# Patient Record
Sex: MALE | Race: WHITE | NOT HISPANIC OR LATINO | Employment: OTHER | URBAN - METROPOLITAN AREA
[De-identification: names, ages, dates, MRNs, and addresses within clinical notes are randomized per-mention and may not be internally consistent; named-entity substitution may affect disease eponyms.]

---

## 2018-10-31 PROBLEM — M54.16 LUMBAR RADICULOPATHY: Status: ACTIVE | Noted: 2018-10-31

## 2020-08-23 ENCOUNTER — CLINICAL SUPPORT (OUTPATIENT)
Dept: URGENT CARE | Facility: CLINIC | Age: 50
End: 2020-08-23
Payer: COMMERCIAL

## 2020-08-23 VITALS — TEMPERATURE: 98 F

## 2020-08-23 DIAGNOSIS — Z01.818 PRE-OPERATIVE CLEARANCE: ICD-10-CM

## 2020-08-23 PROCEDURE — U0003 INFECTIOUS AGENT DETECTION BY NUCLEIC ACID (DNA OR RNA); SEVERE ACUTE RESPIRATORY SYNDROME CORONAVIRUS 2 (SARS-COV-2) (CORONAVIRUS DISEASE [COVID-19]), AMPLIFIED PROBE TECHNIQUE, MAKING USE OF HIGH THROUGHPUT TECHNOLOGIES AS DESCRIBED BY CMS-2020-01-R: HCPCS

## 2020-08-23 NOTE — PROGRESS NOTES
Requests COVID testing for Pre Op. VSS, no symptoms. Will call with results.     Patient Instructions   Instructions for Patients Awaiting COVID-19 Test Results    You will either be called with your test result or it will be released to the patient portal.  If you have any questions about your test, please visit [email or web address] or call our COVID-19 information line at [phone].    Prevention steps for patients with confirmed or suspected COVID-19     Stay home except to get medical care.   Separate yourself from other people and animals in your home   Call ahead before visiting your doctor.   Wear a facemask.   Cover your coughs and sneezes.   Clean your hands often.   Avoid sharing personal household items.   Clean all high-touch surfaces every day.   Monitor your symptoms. Seek prompt medical attention if your illness is worsening (e.g., difficulty breathing). Before seeking care, call your healthcare provider.   If you have a medical emergency and need to call 911, notify the dispatch personnel that you have, or are being evaluated for COVID-19. If possible, put on a facemask before emergency medical services arrive.   Discontinuing home isolation. Call your provider about guidance to discontinue home isolation.    Recommended precautions for household members, intimate partners, and caregivers in a nonhealthcare setting of a patient with symptomatic laboratory-confirmed COVID-19 or a patient under investigation.  Household members, intimate partners, and caregivers in a nonhealthcare setting may have close contact with a person with symptomatic, laboratory-confirmed COVID-19 or a person under investigation. Close contacts should monitor their health; they should call their healthcare provider right away if they develop symptoms suggestive of COVID-19 (e.g., fever, cough, shortness of breath).    Close contacts should also follow these recommendations:   Make sure that you  understand and can help the patient follow their healthcare provider's instructions for medication(s) and care. You should help the patient with basic needs in the home and provide support for getting groceries, prescriptions, and other personal needs.   Monitor the patient's symptoms. If the patient is getting sicker, call his or her healthcare provider and tell them that the patient has laboratory-confirmed COVID-19. This will help the healthcare provider's office take steps to keep other people in the office or waiting room from getting infected. Ask the healthcare provider to call the local or [facility] health department for additional guidance. If the patient has a medical emergency and you need to call 911, notify the dispatch personnel that the patient has, or is being evaluated for COVID-19.   Household members should stay in another room or be  from the patient as much as possible. Household members should use a separate bedroom and bathroom, if available.   Prohibit visitors who do not have an essential need to be in the home.   Household members should care for any pets in the home. Do not handle pets or other animals while sick.   Make sure that shared spaces in the home have good air flow, such as by an air conditioner or an opened window, weather permitting.   Perform hand hygiene frequently. Wash your hands often with soap and water for at least 20 seconds or use an alcohol-based hand  that contains 60 to 95% alcohol, covering all surfaces of your hands and rubbing them together until they feel dry. Soap and water should be used preferentially if hands are visibly dirty.   Avoid touching your eyes, nose, and mouth with unwashed hands.   The patient should wear a facemask when you are around other people. If the patient is not able to wear a facemask (for example, because it causes trouble breathing), you, as the caregiver should wear a mask when you are in the same room as the  patient.   Wear a disposable facemask and gloves when you touch or have contact with the patient's blood, stool, or body fluids, such as saliva, sputum, nasal mucus, vomit, urine.  o Throw out disposable facemasks and gloves after using them. Do not reuse.  o When removing personal protective equipment, first remove and dispose of gloves. Then, immediately clean your hands with soap and water or alcohol-based hand . Next, remove and dispose of facemask, and immediately clean your hands again with soap and water or alcohol-based hand .   Avoid sharing household items with the patient. You should not share dishes, drinking glasses, cups, eating utensils, towels, bedding, or other items. After the patient uses these items, you should wash them thoroughly (see below Wash laundry thoroughly).   Clean all high-touch surfaces, such as counters, tabletops, doorknobs, bathroom fixtures, toilets, phones, keyboards, tablets, and bedside tables, every day. Also, clean any surfaces that may have blood, stool, or body fluids on them.   Use a household cleaning spray or wipe, according to the label instructions. Labels contain instructions for safe and effective use of the cleaning product including precautions you should take when applying the product, such as wearing gloves and making sure you have good ventilation during use of the product.   Wash laundry thoroughly.  o Immediately remove and wash clothes or bedding that have blood, stool, or body fluids on them.  o Wear disposable gloves while handling soiled items and keep soiled items away from your body. Clean your hands (with soap and water or an alcohol-based hand ) immediately after removing your gloves.  o Read and follow directions on labels of laundry or clothing items and detergent. In general, using a normal laundry detergent according to washing machine instructions and dry thoroughly using the warmest temperatures recommended on  the clothing label.   Place all used disposable gloves, facemasks, and other contaminated items in a lined container before disposing of them with other household waste. Clean your hands (with soap and water or an alcohol-based hand ) immediately after handling these items. Soap and water should be used preferentially if hands are visibly dirty.   Discuss any additional questions with your [facility] or local health department or healthcare provider. Check available hours when contacting your local health department.    For more information see CDC link below.      https://www.cdc.gov/coronavirus/2019-ncov/hcp/guidance-prevent-spread.html#precautions        Sources:  [facility], [place] Department of Health and [facility]        If not allergic,take tylenol (acetominophen) for fever control, chills, or body aches every 4 hours. Do not exceed 4000 mg/ day.If not allergic, take Motrin (Ibuprofen) every 4 hours for fever, chills, pain or inflammation. Do not exceed 2400 mg/day. You can alternate taking tylenol and motrin.    If you were prescribed a narcotic medication, do not drive or operate heavy equipment or machinery while taking these medications.  You must understand that you've received an Urgent Care treatment only and that you may be released before all your medical problems are known or treated. You, the patient, will arrange for follow up care as instructed.  Follow up with your PCP or specialty clinic as directed in the next 1-2 weeks if not improved or as needed.  You can call [phone] to schedule an appointment with the appropriate provider.  If your condition worsens we recommend that you receive another evaluation at the emergency room immediately or contact your primary medical clinics after hours call service to discuss your concerns.    Please return here or go to the Emergency Department for any concerns or worsening of condition.    If you have been referred to another provider and wish to  call to check on the status of your referral, please call [facility] Scheduling at [phone]

## 2020-08-24 LAB — SARS-COV-2 RNA RESP QL NAA+PROBE: NOT DETECTED

## 2020-08-25 ENCOUNTER — TELEPHONE (OUTPATIENT)
Dept: URGENT CARE | Facility: CLINIC | Age: 50
End: 2020-08-25

## 2020-08-28 PROBLEM — K62.89 ANAL PAIN: Status: ACTIVE | Noted: 2020-08-28

## 2020-10-21 PROBLEM — C20 MALIGNANT NEOPLASM OF RECTUM: Status: ACTIVE | Noted: 2020-10-21

## 2020-12-15 ENCOUNTER — TELEPHONE (OUTPATIENT)
Dept: SURGERY | Facility: CLINIC | Age: 50
End: 2020-12-15

## 2020-12-15 NOTE — TELEPHONE ENCOUNTER
----- Message from Jessica Patton sent at 12/15/2020  3:07 PM CST -----  Contact: Pt  Pt states he was referred by Dr. 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 Pt states Dr. Tirado felt Dr. Olivarez would be better equipped for surgery. Please follow up with Pt for further questioning.      Confirmed patient's contact info below:     Patient Name: Alexi Gayle     Phone Number: [phone]

## 2020-12-15 NOTE — TELEPHONE ENCOUNTER
Spoke with patient.Informed Dr. Olivarez received message to reach out to referring MD. Patient will be contacted with plan of care.

## 2020-12-17 ENCOUNTER — TELEPHONE (OUTPATIENT)
Dept: SURGERY | Facility: CLINIC | Age: 50
End: 2020-12-17

## 2020-12-17 NOTE — TELEPHONE ENCOUNTER
----- Message from ERICA Olivarez MD sent at 12/16/2020  5:56 PM CST -----  Regarding: office visit at [place]  Contact: Dr. Tirado: [phone]  Please arrange for office visit at [place] office.   Thanks  DV  ----- Message -----  From: Sharlene Chapa RN  Sent: 12/15/2020   3:24 PM CST  To: ERICA Olivarez MD, [facility]    Nadiya- just wanted to pull you into the loop early on.  ----- Message -----  From: Tiara Iniguez  Sent: 12/15/2020   2:29 PM CST  To: [facility] Staff    Dr. Tirado would like to refer the pt over to Dr. Olivarez but would like to speak with Dr. Olivarez first       Please contact Dr. Tirado: [phone]

## 2020-12-17 NOTE — TELEPHONE ENCOUNTER
Spoke with patient and informed him of an appointment with Dr. Olivarez on Monday 12/28 in [place].

## 2020-12-27 NOTE — PROGRESS NOTES
CRS Office Visit History and Physical    Referring Md:   Eagle Triado Md  [address] [place] [address]    SUBJECTIVE:     Chief Complaint: rectal cancer and anal cancer    History of Present Illness:  Patient is a 50 y.o. male presents with history of anal cancer diagnosed in August 2020.  He sought medical attention because he felt an enlarging anal lesion, discomfort.  No bleeding.  He underwent EUA wt excison of an anterior anal mass.      1.  ANAL LESION AT 12 O'CLOCK, EXCISION:   - IN SITU AND INVASIVE WELL DIFFERENTIATED SQUAMOUS CARCINOMA.     2.  POSTERIOR ANAL LESION, EXCISION:   - SKIN TAG.   _______________________________________________________________________   SPECIMEN AND SOURCE:   1. Anal lesion 12 o'clock (perm)   2. Anal lesion posterior (perm)     In addition the patient has a history of rectal cancer.    Colonoscopy revealed a polyp removed by piecemeal snare resection.  This revealed invasive CA.  He was referred for EUS and scar was noted and removed by EMR.  Findings:        ENDOSCOPIC FINDING: :        A medium sized post polypectomy scar was found in the proximal        rectum. It was located along the anterior wall at approximately 10cm        from the ano-rectal verge, proximal to the 2nd rectal fold. The scar        tissue was healthy in appearance. There was no evidence of the        previous polyp. Biopsies were taken with a cold forceps for        histology. There was a small tattoo that was previously placed at        the site, but additional tattoos were placed with injections of Spot        (carbon black). A tattoo was placed on either side of the        polypectomy site and at a 3rd site just distal to the site, to form        a triangle with the polypectomy site in the center. This is to help        guide surgical resection if it becomes necessary.        In the anal canal and exeteriorly, several small condylomas were        noted.        ENDOSONOGRAPHIC  "FINDING: :        The wall layers of the rectum were intact and normal appearing.        There was no evidence of mass or tumor. There was no evidence of        karime-rectal lymphadenopathy. The iliac vessels were visualized and        appeared normal. The seminal vesicles and prostate were visualized        and appeared normal. The internal and external anal sphincters        appeared normal.      Patient - TONY BAUTISTA                  - 1970 [facility] Rec # - [id number]                      Eagle Galo W., M.D.   The following is an electronic copy of report # [id number] from:   [facility]   [address] [place], [place] [id number]                         Phone [phone]   DIAGNOSIS:   2020   JL/kb     Patient - TONY BAUTISTA                  - 1970 [facility] Rec # - [id number]                      Gary Nichols R., M.D.   The following is an electronic copy of report # [id number] from:   [facility]   [address] [place] [address]                         Phone [phone]   DIAGNOSIS:   10/23/2020 JAR/kl     Currently "flared up"  Anal pain since having constipated BM last weekend.  No blood.  No mass since excision of anal lesion.    BMs no  Blood.    Wt stable.  Poor appetite.  No chest pain or cough.      Past Medical History:   Diagnosis Date    DDD (degenerative disc disease), lumbar     Diabetes mellitus     Malignant neoplasm of rectum 10/21/2020    BARON on CPAP     Renal calculi     Spinal stenosis of lumbar region        Past Surgical History:   Procedure Laterality Date    APPENDECTOMY  89    BACK SURGERY  2000    removed piece of bone cartilage from sciatic nerve area    ENDOSCOPIC ULTRASOUND OF LOWER GASTROINTESTINAL TRACT Left 10/21/2020    Procedure: ULTRASOUND, LOWER GI TRACT, ENDOSCOPIC;  Surgeon: Gary Randall MD;  Location: " [facility] ENDO;  Service: Endoscopy;  Laterality: Left;  Gif / Radial    EXAMINATION UNDER ANESTHESIA N/A 8/28/2020    Procedure: Exam under anesthesia -DILATION, RECTUM;  Surgeon: Eagle Tirado MD;  Location: [facility] OR;  Service: General;  Laterality: N/A;    FLEXIBLE SIGMOIDOSCOPY Left 10/21/2020    Procedure: SIGMOIDOSCOPY, FLEXIBLE;  Surgeon: Gary Randall MD;  Location: [facility] ENDO;  Service: Endoscopy;  Laterality: Left;    GALLBLADDER SURGERY  2011    KIDNEY STONE SURGERY  1/2013    LUMBAR EPIDURAL INJECTION  6/2015    MYELOGRAPHY N/A 10/31/2018    Procedure: Myelogram lumbar;  Surgeon: Minesh Infante MD;  Location: [facility] CATH;  Service: Radiology;  Laterality: N/A;    NOSE SURGERY  1991       Review of patient's allergies indicates:  No Known Allergies    Current Outpatient Medications on File Prior to Visit   Medication Sig Dispense Refill    FREESTYLE LITE STRIPS Strp   5    Lactobacillus rhamnosus GG (CULTURELLE) 10 billion cell capsule Take 1 capsule by mouth once daily.      metFORMIN (GLUCOPHAGE-XR) 500 MG ER 24hr tablet Take 1,000 mg by mouth 2 (two) times daily with meals.       NON FORMULARY MEDICATION Take 1 Dose by mouth every evening.       ondansetron (ZOFRAN) 4 MG tablet Take 1 tablet (4 mg total) by mouth every 6 (six) hours. 12 tablet 0    ONE DAILY MULTIVITAMIN ORAL Take 1 tablet by mouth once daily.      oxyCODONE-acetaminophen (PERCOCET) 5-325 mg per tablet Take 1 tablet by mouth every 4 (four) hours as needed for Pain. 12 tablet 0    tamsulosin (FLOMAX) 0.4 mg Cap Take 0.4 mg by mouth once daily.      testosterone cypionate (DEPOTESTOTERONE CYPIONATE) 200 mg/mL injection Inject 100 mg into the muscle every Wednesday.      traMADoL (ULTRAM-ER) 200 MG Tb24 Take 1 tablet by mouth every morning.       traZODone (DESYREL) 150 MG tablet Take 300 mg by mouth nightly.        No current facility-administered medications on file prior to visit.        Family History    Problem Relation Age of Onset    Diabetes Mother     Heart disease Father     Cancer Father     Diabetes Father     Kidney disease Father     Hypertension Father     Diabetes Brother        Social History     Tobacco Use    Smoking status: Current Every Day Smoker     Packs/day: 0.50     Start date: 11/17/1994    Smokeless tobacco: Never Used   Substance Use Topics    Alcohol use: No    Drug use: Yes     Types: Marijuana     Comment: nightly        Review of Systems:  ROS:  GENERAL: No fever, chills, fatigability or weight loss.  Integument:  No rashes, redness, icterus  CHEST: Denies DENG, cyanosis, wheezing, cough and sputum production.  CARDIOVASCULAR: Denies chest pain, PND, orthopnea or reduced exercise tolerance.  GI:  Denies abd pain, dysphagia, nausea, vomiting, no hematemesis, no rectal pain  : Denies burning on urination, no hematuria, no bacteriuria  MSK:  No deformities, swelling, joint pain swelling  Neurologic:  No HAs, seizures, weakness, paresthesias, gait problems      OBJECTIVE:     Vital Signs (Most Recent)  /81   Pulse 87   Temp 98.2 °F (36.8 °C)   Wt 92.9 kg (204 lb 12.9 oz)   BMI 32.08 kg/m²   Physical Exam:  General: White male in no distress   Skin/ Sclera anicteric  LN small inguinal nodes, left side <1 cm  HEENT: anicteric, normocephalic, extraocular movements intact   Neck trachea midline, thyroid non enlarged  Chest symmetric, nl excursions, no retractions  Respiratory: respirations are even and unlabored  COR RRR   Abdomen - inspection   soft NT ND.  no masses, no organomegaly    Anorectal: Marked spasm.  Scar anteriorly without any palpable mass  Inspection             JOSESITO: Increased  tone, no palpable anal masses  Extremities: Warm dry and intact.  NO CCE  Neuro: alert and oriented x 4.  Moves all extremities.         ASSESSMENT/PLAN:   1.  Anal cancer  2.  Rectal cancer  3.  Anal fissure      Recommend  Flex sig  PET scan  Review pathology from anal lesion and  rectal polyp excision  Treat anal fissure  1. SOAK TID in tub warm water only (or hand held shower to anus)  2. Diltiazem cream TID  3.  High fiber diet - 25 grams per day.  Emphasis on whole grain breads such as double fiber wheat bread and high fiber cereals and bars.    Drink 8 glasses of water per day 64 - 96 oz per day  Fiber supplements such as KONSYL, METAMUCIL can be taken 1-2 x per day  Regular exercise - 30 min brisk walking 5 days per week  4.  RTC 1 week.    5.  Begin miralax daily prn

## 2020-12-28 ENCOUNTER — OFFICE VISIT (OUTPATIENT)
Dept: SURGERY | Facility: CLINIC | Age: 50
End: 2020-12-28
Payer: COMMERCIAL

## 2020-12-28 ENCOUNTER — LAB VISIT (OUTPATIENT)
Dept: LAB | Facility: HOSPITAL | Age: 50
End: 2020-12-28
Attending: COLON & RECTAL SURGERY
Payer: COMMERCIAL

## 2020-12-28 VITALS
HEART RATE: 87 BPM | DIASTOLIC BLOOD PRESSURE: 81 MMHG | BODY MASS INDEX: 32.08 KG/M2 | SYSTOLIC BLOOD PRESSURE: 125 MMHG | TEMPERATURE: 98 F | WEIGHT: 204.81 LBS

## 2020-12-28 DIAGNOSIS — C21.0 ANAL CANCER: Primary | ICD-10-CM

## 2020-12-28 DIAGNOSIS — K60.1 CHRONIC POSTERIOR ANAL FISSURE: ICD-10-CM

## 2020-12-28 DIAGNOSIS — C20 RECTAL CANCER: ICD-10-CM

## 2020-12-28 LAB — CEA SERPL-MCNC: 5.3 NG/ML (ref 0–5)

## 2020-12-28 PROCEDURE — 99204 PR OFFICE/OUTPT VISIT, NEW, LEVL IV, 45-59 MIN: ICD-10-PCS | Mod: S$GLB,,, | Performed by: COLON & RECTAL SURGERY

## 2020-12-28 PROCEDURE — 3008F BODY MASS INDEX DOCD: CPT | Mod: CPTII,S$GLB,, | Performed by: COLON & RECTAL SURGERY

## 2020-12-28 PROCEDURE — 99204 OFFICE O/P NEW MOD 45 MIN: CPT | Mod: S$GLB,,, | Performed by: COLON & RECTAL SURGERY

## 2020-12-28 PROCEDURE — 99999 PR PBB SHADOW E&M-EST. PATIENT-LVL III: ICD-10-PCS | Mod: PBBFAC,,, | Performed by: COLON & RECTAL SURGERY

## 2020-12-28 PROCEDURE — 99999 PR PBB SHADOW E&M-EST. PATIENT-LVL III: CPT | Mod: PBBFAC,,, | Performed by: COLON & RECTAL SURGERY

## 2020-12-28 PROCEDURE — 36415 COLL VENOUS BLD VENIPUNCTURE: CPT | Mod: PN

## 2020-12-28 PROCEDURE — 82378 CARCINOEMBRYONIC ANTIGEN: CPT | Mod: PN

## 2020-12-28 PROCEDURE — 3008F PR BODY MASS INDEX (BMI) DOCUMENTED: ICD-10-PCS | Mod: CPTII,S$GLB,, | Performed by: COLON & RECTAL SURGERY

## 2020-12-28 PROCEDURE — 82378 CARCINOEMBRYONIC ANTIGEN: CPT

## 2020-12-28 RX ORDER — SILDENAFIL 100 MG/1
100 TABLET, FILM COATED ORAL
COMMUNITY
Start: 2020-11-11

## 2020-12-28 NOTE — LETTER
December 29, 2020      Eagle Tirado MD  [address]  [facility] [address]           [facility]  [address] [facility] [address]  [facility] [address]  Phone: [phone]  Fax: [phone]          Patient: Alexi Gayle   MR Number: [id number]   YOB: 1970   Date of Visit: 12/28/2020       Dear Dr. Eagle Tirado:    Thank you for referring Alexi Gayle to me for evaluation. Attached you will find relevant portions of my assessment and plan of care.    If you have questions, please do not hesitate to call me. I look forward to following Alexi Gayle along with you.    Sincerely,    ERICA Olivarez MD    Enclosure  CC:  No Recipients    If you would like to receive this communication electronically, please contact [email or web address] or [phone] to request more information on [facility] access.    For providers and/or their staff who would like to refer a patient to [facility], please contact us through our one-stop-shop provider referral line, [facility], at [phone].    If you feel you have received this communication in error or would no longer like to receive these types of communications, please e-mail [email or web address]

## 2020-12-29 ENCOUNTER — TELEPHONE (OUTPATIENT)
Dept: GASTROENTEROLOGY | Facility: CLINIC | Age: 50
End: 2020-12-29

## 2020-12-29 DIAGNOSIS — Z11.52 ENCOUNTER FOR PREOPERATIVE SCREENING LABORATORY TESTING FOR COVID-19 VIRUS: ICD-10-CM

## 2020-12-29 DIAGNOSIS — Z01.812 ENCOUNTER FOR PREOPERATIVE SCREENING LABORATORY TESTING FOR COVID-19 VIRUS: ICD-10-CM

## 2020-12-29 PROBLEM — K60.1 CHRONIC POSTERIOR ANAL FISSURE: Status: ACTIVE | Noted: 2020-12-29

## 2020-12-29 PROBLEM — C21.0 ANAL CANCER: Status: ACTIVE | Noted: 2020-12-29

## 2020-12-29 PROBLEM — C44.500: Status: ACTIVE | Noted: 2020-12-29

## 2020-12-30 ENCOUNTER — DOCUMENTATION ONLY (OUTPATIENT)
Dept: SURGERY | Facility: HOSPITAL | Age: 50
End: 2020-12-30

## 2020-12-30 NOTE — PROGRESS NOTES
Multidisciplinary Rectal Cancer Conference - Evaluation and Recommendation Summary  12/30/2020  Alexi Gayle  [id number]  50 y.o. male    1. Evaluation    50M presents with history of anal cancer diagnosed in August 2020.  He sought medical attention because he felt an enlarging anal lesion, discomfort.  No bleeding. He underwent EUA [facility] excison of an anterior anal mass. Biopsy with in situ, well differentiated SCC.    Colonoscopy revealed a polyp removed by piecemeal snare resection.  This revealed invasive CA.  He was referred for EUS and scar was noted and removed by EMR.    ENDOSCOPIC FINDING: A medium sized post polypectomy scar was found in the proximal rectum. It was located along the anterior wall at approximately 10cm from the ano-rectal verge, proximal to the 2nd rectal fold. The scar tissue was healthy in appearance. There was no evidence of the previous polyp. Biopsies were taken with a cold forceps for histology. There was a small tattoo that was previously placed at the site, but additional tattoos were placed with injections of Spot (carbon black). A tattoo was placed on either side of the   polypectomy site and at a 3rd site just distal to the site, to form a triangle with the polypectomy site in the center. This is to help guide surgical resection if it becomes necessary.  In the anal canal and exeteriorly, several small condylomas were noted.     ENDOSONOGRAPHIC FINDING: The wall layers of the rectum were intact and normal appearing. There was no evidence of mass or tumor. There was no evidence of karime-rectal lymphadenopathy. The iliac vessels were visualized and appeared normal. The seminal vesicles and prostate were visualized and appeared normal. The internal and external anal sphincters appeared normal.    Rectal polypectomy site negative for dysplasia.     CEA level:   Lab Results   Component Value Date    CEA 5.3 (H) 12/28/2020       2. Treatment Recommendation    Will plan to obtain  pathology from outside hospital to review and make recommendations for further treatment.

## 2020-12-30 NOTE — PATIENT INSTRUCTIONS
1. SOAK TID in tub warm water only (or hand held shower to anus)  2. Diltiazem cream TID  3.  High fiber diet - 25 grams per day.  Emphasis on whole grain breads such as double fiber wheat bread and high fiber cereals and bars.    Drink 8 glasses of water per day 64 - 96 oz per day  Fiber supplements such as KONSYL, METAMUCIL can be taken 1-2 x per day  Regular exercise - 30 min brisk walking 5 days per week  4.  RTC 6 weeks.    5.  Begin miralax daily prn

## 2021-01-02 ENCOUNTER — LAB VISIT (OUTPATIENT)
Dept: FAMILY MEDICINE | Facility: CLINIC | Age: 51
End: 2021-01-02
Payer: COMMERCIAL

## 2021-01-02 DIAGNOSIS — Z01.812 ENCOUNTER FOR PREOPERATIVE SCREENING LABORATORY TESTING FOR COVID-19 VIRUS: ICD-10-CM

## 2021-01-02 DIAGNOSIS — Z11.52 ENCOUNTER FOR PREOPERATIVE SCREENING LABORATORY TESTING FOR COVID-19 VIRUS: ICD-10-CM

## 2021-01-02 PROCEDURE — U0003 INFECTIOUS AGENT DETECTION BY NUCLEIC ACID (DNA OR RNA); SEVERE ACUTE RESPIRATORY SYNDROME CORONAVIRUS 2 (SARS-COV-2) (CORONAVIRUS DISEASE [COVID-19]), AMPLIFIED PROBE TECHNIQUE, MAKING USE OF HIGH THROUGHPUT TECHNOLOGIES AS DESCRIBED BY CMS-2020-01-R: HCPCS

## 2021-01-03 LAB — SARS-COV-2 RNA RESP QL NAA+PROBE: NOT DETECTED

## 2021-01-04 ENCOUNTER — HOSPITAL ENCOUNTER (OUTPATIENT)
Facility: HOSPITAL | Age: 51
Discharge: HOME | End: 2021-01-04
Attending: COLON & RECTAL SURGERY | Admitting: COLON & RECTAL SURGERY
Payer: COMMERCIAL

## 2021-01-04 ENCOUNTER — ANESTHESIA EVENT (OUTPATIENT)
Dept: ENDOSCOPY | Facility: HOSPITAL | Age: 51
End: 2021-01-04
Payer: COMMERCIAL

## 2021-01-04 ENCOUNTER — TELEPHONE (OUTPATIENT)
Dept: SURGERY | Facility: CLINIC | Age: 51
End: 2021-01-04

## 2021-01-04 ENCOUNTER — ANESTHESIA (OUTPATIENT)
Dept: ENDOSCOPY | Facility: HOSPITAL | Age: 51
End: 2021-01-04
Payer: COMMERCIAL

## 2021-01-04 DIAGNOSIS — C20 MALIGNANT NEOPLASM OF RECTUM: Primary | ICD-10-CM

## 2021-01-04 DIAGNOSIS — C21.0 ANAL CANCER: Primary | ICD-10-CM

## 2021-01-04 DIAGNOSIS — C20 RECTAL CANCER: ICD-10-CM

## 2021-01-04 LAB — GLUCOSE SERPL-MCNC: 173 MG/DL (ref 70–110)

## 2021-01-04 PROCEDURE — D9220A PRA ANESTHESIA: Mod: ANES,,, | Performed by: ANESTHESIOLOGY

## 2021-01-04 PROCEDURE — D9220A PRA ANESTHESIA: ICD-10-PCS | Mod: ANES,,, | Performed by: ANESTHESIOLOGY

## 2021-01-04 PROCEDURE — 63600175 PHARM REV CODE 636 W HCPCS: Mod: PO | Performed by: NURSE ANESTHETIST, CERTIFIED REGISTERED

## 2021-01-04 PROCEDURE — 45330 DIAGNOSTIC SIGMOIDOSCOPY: CPT | Mod: PO | Performed by: COLON & RECTAL SURGERY

## 2021-01-04 PROCEDURE — 37000009 HC ANESTHESIA EA ADD 15 MINS: Mod: PO | Performed by: COLON & RECTAL SURGERY

## 2021-01-04 PROCEDURE — 37000008 HC ANESTHESIA 1ST 15 MINUTES: Mod: PO | Performed by: COLON & RECTAL SURGERY

## 2021-01-04 PROCEDURE — D9220A PRA ANESTHESIA: Mod: CRNA,,, | Performed by: NURSE ANESTHETIST, CERTIFIED REGISTERED

## 2021-01-04 PROCEDURE — 25000003 PHARM REV CODE 250: Mod: PO | Performed by: NURSE ANESTHETIST, CERTIFIED REGISTERED

## 2021-01-04 PROCEDURE — 45330 PR SIGMOIDOSCOPY,DIAG2STIC: ICD-10-PCS | Mod: ,,, | Performed by: COLON & RECTAL SURGERY

## 2021-01-04 PROCEDURE — 45330 DIAGNOSTIC SIGMOIDOSCOPY: CPT | Mod: ,,, | Performed by: COLON & RECTAL SURGERY

## 2021-01-04 PROCEDURE — D9220A PRA ANESTHESIA: ICD-10-PCS | Mod: CRNA,,, | Performed by: NURSE ANESTHETIST, CERTIFIED REGISTERED

## 2021-01-04 RX ORDER — LIDOCAINE HYDROCHLORIDE 20 MG/ML
INJECTION INTRAVENOUS
Status: DISCONTINUED | OUTPATIENT
Start: 2021-01-04 | End: 2021-01-04

## 2021-01-04 RX ORDER — PROPOFOL 10 MG/ML
VIAL (ML) INTRAVENOUS
Status: DISCONTINUED | OUTPATIENT
Start: 2021-01-04 | End: 2021-01-04

## 2021-01-04 RX ORDER — PROPOFOL 10 MG/ML
VIAL (ML) INTRAVENOUS CONTINUOUS PRN
Status: DISCONTINUED | OUTPATIENT
Start: 2021-01-04 | End: 2021-01-04

## 2021-01-04 RX ADMIN — LIDOCAINE HYDROCHLORIDE 100 MG: 20 INJECTION, SOLUTION INTRAVENOUS at 07:01

## 2021-01-04 RX ADMIN — PROPOFOL 150 MCG/KG/MIN: 10 INJECTION, EMULSION INTRAVENOUS at 07:01

## 2021-01-04 RX ADMIN — PROPOFOL 100 MG: 10 INJECTION, EMULSION INTRAVENOUS at 07:01

## 2021-01-04 RX ADMIN — SODIUM CHLORIDE, SODIUM LACTATE, POTASSIUM CHLORIDE, AND CALCIUM CHLORIDE: .6; .31; .03; .02 INJECTION, SOLUTION INTRAVENOUS at 07:01

## 2021-01-05 VITALS
HEIGHT: 70 IN | DIASTOLIC BLOOD PRESSURE: 58 MMHG | SYSTOLIC BLOOD PRESSURE: 98 MMHG | HEART RATE: 80 BPM | WEIGHT: 195 LBS | BODY MASS INDEX: 27.92 KG/M2 | OXYGEN SATURATION: 99 % | RESPIRATION RATE: 16 BRPM | TEMPERATURE: 98 F

## 2021-01-06 ENCOUNTER — HOSPITAL ENCOUNTER (OUTPATIENT)
Dept: RADIOLOGY | Facility: HOSPITAL | Age: 51
Discharge: HOME | End: 2021-01-06
Attending: COLON & RECTAL SURGERY
Payer: COMMERCIAL

## 2021-01-06 DIAGNOSIS — C21.0 ANAL CANCER: ICD-10-CM

## 2021-01-06 DIAGNOSIS — C20 RECTAL CANCER: ICD-10-CM

## 2021-01-06 PROCEDURE — 71260 CT THORAX DX C+: CPT | Mod: 26,,, | Performed by: RADIOLOGY

## 2021-01-06 PROCEDURE — 71260 CT CHEST WITH CONTRAST: ICD-10-PCS | Mod: 26,,, | Performed by: RADIOLOGY

## 2021-01-06 PROCEDURE — 25500020 PHARM REV CODE 255: Mod: PO | Performed by: COLON & RECTAL SURGERY

## 2021-01-06 PROCEDURE — 71260 CT THORAX DX C+: CPT | Mod: TC,PO

## 2021-01-06 RX ADMIN — IOHEXOL 75 ML: 350 INJECTION, SOLUTION INTRAVENOUS at 12:01

## 2021-01-11 ENCOUNTER — OFFICE VISIT (OUTPATIENT)
Dept: SURGERY | Facility: CLINIC | Age: 51
End: 2021-01-11
Payer: COMMERCIAL

## 2021-01-11 VITALS
OXYGEN SATURATION: 97 % | SYSTOLIC BLOOD PRESSURE: 121 MMHG | TEMPERATURE: 97 F | HEART RATE: 84 BPM | BODY MASS INDEX: 29.65 KG/M2 | DIASTOLIC BLOOD PRESSURE: 70 MMHG | HEIGHT: 70 IN | WEIGHT: 207.13 LBS

## 2021-01-11 DIAGNOSIS — C21.0 ANAL CANCER: ICD-10-CM

## 2021-01-11 DIAGNOSIS — C20 RECTAL CANCER: Primary | ICD-10-CM

## 2021-01-11 PROCEDURE — 99214 PR OFFICE/OUTPT VISIT, EST, LEVL IV, 30-39 MIN: ICD-10-PCS | Mod: S$GLB,,, | Performed by: COLON & RECTAL SURGERY

## 2021-01-11 PROCEDURE — 1126F PR PAIN SEVERITY QUANTIFIED, NO PAIN PRESENT: ICD-10-PCS | Mod: S$GLB,,, | Performed by: COLON & RECTAL SURGERY

## 2021-01-11 PROCEDURE — 1126F AMNT PAIN NOTED NONE PRSNT: CPT | Mod: S$GLB,,, | Performed by: COLON & RECTAL SURGERY

## 2021-01-11 PROCEDURE — 3008F PR BODY MASS INDEX (BMI) DOCUMENTED: ICD-10-PCS | Mod: CPTII,S$GLB,, | Performed by: COLON & RECTAL SURGERY

## 2021-01-11 PROCEDURE — 99214 OFFICE O/P EST MOD 30 MIN: CPT | Mod: S$GLB,,, | Performed by: COLON & RECTAL SURGERY

## 2021-01-11 PROCEDURE — 3008F BODY MASS INDEX DOCD: CPT | Mod: CPTII,S$GLB,, | Performed by: COLON & RECTAL SURGERY

## 2021-01-11 PROCEDURE — 99999 PR PBB SHADOW E&M-EST. PATIENT-LVL IV: CPT | Mod: PBBFAC,,, | Performed by: COLON & RECTAL SURGERY

## 2021-01-11 PROCEDURE — 99999 PR PBB SHADOW E&M-EST. PATIENT-LVL IV: ICD-10-PCS | Mod: PBBFAC,,, | Performed by: COLON & RECTAL SURGERY

## 2021-01-11 RX ORDER — POLYETHYLENE GLYCOL 3350 17 G/17G
POWDER, FOR SOLUTION ORAL
Qty: 238 G | Refills: 0 | Status: ON HOLD | OUTPATIENT
Start: 2021-01-11 | End: 2021-01-21 | Stop reason: HOSPADM

## 2021-01-11 RX ORDER — METRONIDAZOLE 500 MG/1
TABLET ORAL
Qty: 3 TABLET | Refills: 0 | Status: ON HOLD | OUTPATIENT
Start: 2021-01-11 | End: 2021-01-21 | Stop reason: HOSPADM

## 2021-01-11 RX ORDER — NEOMYCIN SULFATE 500 MG/1
TABLET ORAL
Qty: 6 TABLET | Refills: 0 | Status: ON HOLD | OUTPATIENT
Start: 2021-01-11 | End: 2021-01-21 | Stop reason: HOSPADM

## 2021-01-15 DIAGNOSIS — Z01.818 PRE-OP EVALUATION: Primary | ICD-10-CM

## 2021-01-20 ENCOUNTER — TELEPHONE (OUTPATIENT)
Dept: SURGERY | Facility: CLINIC | Age: 51
End: 2021-01-20

## 2021-01-21 ENCOUNTER — ANESTHESIA EVENT (OUTPATIENT)
Dept: SURGERY | Facility: HOSPITAL | Age: 51
End: 2021-01-21
Payer: COMMERCIAL

## 2021-01-21 ENCOUNTER — ANESTHESIA (OUTPATIENT)
Dept: SURGERY | Facility: HOSPITAL | Age: 51
End: 2021-01-21
Payer: COMMERCIAL

## 2021-01-21 ENCOUNTER — HOSPITAL ENCOUNTER (OUTPATIENT)
Facility: HOSPITAL | Age: 51
Discharge: HOME | End: 2021-01-21
Attending: COLON & RECTAL SURGERY | Admitting: COLON & RECTAL SURGERY
Payer: COMMERCIAL

## 2021-01-21 VITALS
HEART RATE: 96 BPM | BODY MASS INDEX: 27.92 KG/M2 | WEIGHT: 195 LBS | SYSTOLIC BLOOD PRESSURE: 126 MMHG | TEMPERATURE: 98 F | HEIGHT: 70 IN | OXYGEN SATURATION: 95 % | DIASTOLIC BLOOD PRESSURE: 77 MMHG | RESPIRATION RATE: 20 BRPM

## 2021-01-21 DIAGNOSIS — C21.0 ANAL CANCER: Primary | ICD-10-CM

## 2021-01-21 DIAGNOSIS — Z85.048 HISTORY OF RECTAL CANCER: ICD-10-CM

## 2021-01-21 DIAGNOSIS — Z85.048 HISTORY OF ANAL CANCER: ICD-10-CM

## 2021-01-21 LAB
POCT GLUCOSE: 156 MG/DL (ref 70–110)
POCT GLUCOSE: 186 MG/DL (ref 70–110)
SARS-COV-2 RDRP RESP QL NAA+PROBE: NEGATIVE

## 2021-01-21 PROCEDURE — 45330 PR SIGMOIDOSCOPY,DIAG2STIC: ICD-10-PCS | Mod: 51,,, | Performed by: COLON & RECTAL SURGERY

## 2021-01-21 PROCEDURE — 36000710: Performed by: COLON & RECTAL SURGERY

## 2021-01-21 PROCEDURE — U0002 COVID-19 LAB TEST NON-CDC: HCPCS

## 2021-01-21 PROCEDURE — 71000044 HC DOSC ROUTINE RECOVERY FIRST HOUR: Performed by: COLON & RECTAL SURGERY

## 2021-01-21 PROCEDURE — 46922 EXCISION OF ANAL LESION(S): CPT | Mod: ,,, | Performed by: COLON & RECTAL SURGERY

## 2021-01-21 PROCEDURE — 63600175 PHARM REV CODE 636 W HCPCS: Performed by: NURSE ANESTHETIST, CERTIFIED REGISTERED

## 2021-01-21 PROCEDURE — D9220A PRA ANESTHESIA: ICD-10-PCS | Mod: ANES,,, | Performed by: ANESTHESIOLOGY

## 2021-01-21 PROCEDURE — 88305 TISSUE EXAM BY PATHOLOGIST: CPT | Performed by: PATHOLOGY

## 2021-01-21 PROCEDURE — 36000711: Performed by: COLON & RECTAL SURGERY

## 2021-01-21 PROCEDURE — 71000016 HC POSTOP RECOV ADDL HR: Performed by: COLON & RECTAL SURGERY

## 2021-01-21 PROCEDURE — D9220A PRA ANESTHESIA: Mod: CRNA,,, | Performed by: NURSE ANESTHETIST, CERTIFIED REGISTERED

## 2021-01-21 PROCEDURE — 71000015 HC POSTOP RECOV 1ST HR: Performed by: COLON & RECTAL SURGERY

## 2021-01-21 PROCEDURE — 45330 DIAGNOSTIC SIGMOIDOSCOPY: CPT | Mod: 51,,, | Performed by: COLON & RECTAL SURGERY

## 2021-01-21 PROCEDURE — 37000009 HC ANESTHESIA EA ADD 15 MINS: Performed by: COLON & RECTAL SURGERY

## 2021-01-21 PROCEDURE — 82962 GLUCOSE BLOOD TEST: CPT | Performed by: COLON & RECTAL SURGERY

## 2021-01-21 PROCEDURE — 25000003 PHARM REV CODE 250: Performed by: NURSE PRACTITIONER

## 2021-01-21 PROCEDURE — D9220A PRA ANESTHESIA: Mod: ANES,,, | Performed by: ANESTHESIOLOGY

## 2021-01-21 PROCEDURE — 88305 TISSUE EXAM BY PATHOLOGIST: CPT | Mod: 26,,, | Performed by: PATHOLOGY

## 2021-01-21 PROCEDURE — 27201423 OPTIME MED/SURG SUP & DEVICES STERILE SUPPLY: Performed by: COLON & RECTAL SURGERY

## 2021-01-21 PROCEDURE — D9220A PRA ANESTHESIA: ICD-10-PCS | Mod: CRNA,,, | Performed by: NURSE ANESTHETIST, CERTIFIED REGISTERED

## 2021-01-21 PROCEDURE — 46922 PR SURG EXCISION OF ANAL LESION(S): ICD-10-PCS | Mod: ,,, | Performed by: COLON & RECTAL SURGERY

## 2021-01-21 PROCEDURE — 88305 TISSUE EXAM BY PATHOLOGIST: ICD-10-PCS | Mod: 26,,, | Performed by: PATHOLOGY

## 2021-01-21 PROCEDURE — 37000008 HC ANESTHESIA 1ST 15 MINUTES: Performed by: COLON & RECTAL SURGERY

## 2021-01-21 PROCEDURE — 25000003 PHARM REV CODE 250: Performed by: NURSE ANESTHETIST, CERTIFIED REGISTERED

## 2021-01-21 PROCEDURE — 25000003 PHARM REV CODE 250: Performed by: COLON & RECTAL SURGERY

## 2021-01-21 PROCEDURE — S0030 INJECTION, METRONIDAZOLE: HCPCS | Performed by: NURSE ANESTHETIST, CERTIFIED REGISTERED

## 2021-01-21 RX ORDER — FENTANYL CITRATE 50 UG/ML
INJECTION, SOLUTION INTRAMUSCULAR; INTRAVENOUS
Status: DISCONTINUED | OUTPATIENT
Start: 2021-01-21 | End: 2021-01-21

## 2021-01-21 RX ORDER — PROPOFOL 10 MG/ML
VIAL (ML) INTRAVENOUS
Status: DISCONTINUED | OUTPATIENT
Start: 2021-01-21 | End: 2021-01-21

## 2021-01-21 RX ORDER — OXYCODONE HYDROCHLORIDE 5 MG/1
5 TABLET ORAL EVERY 4 HOURS PRN
Qty: 30 TABLET | Refills: 0 | Status: SHIPPED | OUTPATIENT
Start: 2021-01-21

## 2021-01-21 RX ORDER — ROCURONIUM BROMIDE 10 MG/ML
INJECTION, SOLUTION INTRAVENOUS
Status: DISCONTINUED | OUTPATIENT
Start: 2021-01-21 | End: 2021-01-21

## 2021-01-21 RX ORDER — ONDANSETRON 2 MG/ML
INJECTION INTRAMUSCULAR; INTRAVENOUS
Status: DISCONTINUED | OUTPATIENT
Start: 2021-01-21 | End: 2021-01-21

## 2021-01-21 RX ORDER — FENTANYL CITRATE 50 UG/ML
25 INJECTION, SOLUTION INTRAMUSCULAR; INTRAVENOUS EVERY 5 MIN PRN
Status: DISCONTINUED | OUTPATIENT
Start: 2021-01-21 | End: 2021-01-21 | Stop reason: HOSPADM

## 2021-01-21 RX ORDER — LIDOCAINE HYDROCHLORIDE 20 MG/ML
INJECTION, SOLUTION EPIDURAL; INFILTRATION; INTRACAUDAL; PERINEURAL
Status: DISCONTINUED | OUTPATIENT
Start: 2021-01-21 | End: 2021-01-21

## 2021-01-21 RX ORDER — MIDAZOLAM HYDROCHLORIDE 1 MG/ML
INJECTION, SOLUTION INTRAMUSCULAR; INTRAVENOUS
Status: DISCONTINUED | OUTPATIENT
Start: 2021-01-21 | End: 2021-01-21

## 2021-01-21 RX ORDER — KETAMINE HCL IN 0.9 % NACL 50 MG/5 ML
SYRINGE (ML) INTRAVENOUS
Status: DISCONTINUED | OUTPATIENT
Start: 2021-01-21 | End: 2021-01-21

## 2021-01-21 RX ORDER — MUPIROCIN 20 MG/G
OINTMENT TOPICAL
Status: DISCONTINUED | OUTPATIENT
Start: 2021-01-21 | End: 2021-01-21 | Stop reason: HOSPADM

## 2021-01-21 RX ORDER — DEXAMETHASONE SODIUM PHOSPHATE 4 MG/ML
INJECTION, SOLUTION INTRA-ARTICULAR; INTRALESIONAL; INTRAMUSCULAR; INTRAVENOUS; SOFT TISSUE
Status: DISCONTINUED | OUTPATIENT
Start: 2021-01-21 | End: 2021-01-21

## 2021-01-21 RX ORDER — ONDANSETRON 2 MG/ML
4 INJECTION INTRAMUSCULAR; INTRAVENOUS ONCE AS NEEDED
Status: DISCONTINUED | OUTPATIENT
Start: 2021-01-21 | End: 2021-01-21 | Stop reason: HOSPADM

## 2021-01-21 RX ORDER — BUPIVACAINE HYDROCHLORIDE AND EPINEPHRINE 2.5; 5 MG/ML; UG/ML
INJECTION, SOLUTION EPIDURAL; INFILTRATION; INTRACAUDAL; PERINEURAL
Status: DISCONTINUED | OUTPATIENT
Start: 2021-01-21 | End: 2021-01-21 | Stop reason: HOSPADM

## 2021-01-21 RX ORDER — NEOSTIGMINE METHYLSULFATE 0.5 MG/ML
INJECTION, SOLUTION INTRAVENOUS
Status: DISCONTINUED | OUTPATIENT
Start: 2021-01-21 | End: 2021-01-21

## 2021-01-21 RX ORDER — METRONIDAZOLE 500 MG/100ML
INJECTION, SOLUTION INTRAVENOUS
Status: DISCONTINUED | OUTPATIENT
Start: 2021-01-21 | End: 2021-01-21

## 2021-01-21 RX ORDER — ACETAMINOPHEN 10 MG/ML
INJECTION, SOLUTION INTRAVENOUS
Status: DISCONTINUED | OUTPATIENT
Start: 2021-01-21 | End: 2021-01-21

## 2021-01-21 RX ORDER — PHENYLEPHRINE HCL IN 0.9% NACL 1 MG/10 ML
SYRINGE (ML) INTRAVENOUS
Status: DISCONTINUED | OUTPATIENT
Start: 2021-01-21 | End: 2021-01-21

## 2021-01-21 RX ORDER — SODIUM CHLORIDE 9 MG/ML
INJECTION, SOLUTION INTRAVENOUS CONTINUOUS
Status: DISCONTINUED | OUTPATIENT
Start: 2021-01-21 | End: 2021-01-21 | Stop reason: HOSPADM

## 2021-01-21 RX ORDER — HYDROMORPHONE HYDROCHLORIDE 1 MG/ML
0.2 INJECTION, SOLUTION INTRAMUSCULAR; INTRAVENOUS; SUBCUTANEOUS EVERY 5 MIN PRN
Status: DISCONTINUED | OUTPATIENT
Start: 2021-01-21 | End: 2021-01-21 | Stop reason: HOSPADM

## 2021-01-21 RX ADMIN — ACETAMINOPHEN 1000 MG: 10 INJECTION, SOLUTION INTRAVENOUS at 08:01

## 2021-01-21 RX ADMIN — MIDAZOLAM 2 MG: 1 INJECTION INTRAMUSCULAR; INTRAVENOUS at 07:01

## 2021-01-21 RX ADMIN — ROCURONIUM BROMIDE 10 MG: 10 INJECTION, SOLUTION INTRAVENOUS at 07:01

## 2021-01-21 RX ADMIN — FENTANYL CITRATE 25 MCG: 50 INJECTION INTRAMUSCULAR; INTRAVENOUS at 08:01

## 2021-01-21 RX ADMIN — SODIUM CHLORIDE: 0.9 INJECTION, SOLUTION INTRAVENOUS at 07:01

## 2021-01-21 RX ADMIN — Medication 100 MCG: at 08:01

## 2021-01-21 RX ADMIN — Medication 20 MG: at 08:01

## 2021-01-21 RX ADMIN — DEXAMETHASONE SODIUM PHOSPHATE 4 MG: 4 INJECTION INTRA-ARTICULAR; INTRALESIONAL; INTRAMUSCULAR; INTRAVENOUS; SOFT TISSUE at 07:01

## 2021-01-21 RX ADMIN — LIDOCAINE HYDROCHLORIDE 100 MG: 20 INJECTION, SOLUTION EPIDURAL; INFILTRATION; INTRACAUDAL at 07:01

## 2021-01-21 RX ADMIN — ROCURONIUM BROMIDE 50 MG: 10 INJECTION, SOLUTION INTRAVENOUS at 07:01

## 2021-01-21 RX ADMIN — CEFTRIAXONE SODIUM 2 G: 1 INJECTION, POWDER, FOR SOLUTION INTRAMUSCULAR; INTRAVENOUS at 07:01

## 2021-01-21 RX ADMIN — FENTANYL CITRATE 50 MCG: 50 INJECTION INTRAMUSCULAR; INTRAVENOUS at 07:01

## 2021-01-21 RX ADMIN — NEOSTIGMINE METHYLSULFATE 5 MG: 0.5 INJECTION INTRAVENOUS at 08:01

## 2021-01-21 RX ADMIN — PROPOFOL 30 MG: 10 INJECTION, EMULSION INTRAVENOUS at 07:01

## 2021-01-21 RX ADMIN — FENTANYL CITRATE 50 MCG: 50 INJECTION INTRAMUSCULAR; INTRAVENOUS at 08:01

## 2021-01-21 RX ADMIN — MUPIROCIN: 20 OINTMENT TOPICAL at 06:01

## 2021-01-21 RX ADMIN — PROPOFOL 150 MG: 10 INJECTION, EMULSION INTRAVENOUS at 07:01

## 2021-01-21 RX ADMIN — ONDANSETRON 4 MG: 2 INJECTION, SOLUTION INTRAMUSCULAR; INTRAVENOUS at 08:01

## 2021-01-21 RX ADMIN — GLYCOPYRROLATE 0.8 MG: 0.2 INJECTION, SOLUTION INTRAMUSCULAR; INTRAVITREAL at 08:01

## 2021-01-21 RX ADMIN — METRONIDAZOLE 500 MG: 500 INJECTION, SOLUTION INTRAVENOUS at 07:01

## 2021-01-21 RX ADMIN — ROCURONIUM BROMIDE 20 MG: 10 INJECTION, SOLUTION INTRAVENOUS at 07:01

## 2021-01-29 LAB
FINAL PATHOLOGIC DIAGNOSIS: NORMAL
GROSS: NORMAL
Lab: NORMAL

## 2021-02-01 ENCOUNTER — OFFICE VISIT (OUTPATIENT)
Dept: SURGERY | Facility: CLINIC | Age: 51
End: 2021-02-01
Payer: COMMERCIAL

## 2021-02-01 VITALS
HEART RATE: 89 BPM | BODY MASS INDEX: 29.26 KG/M2 | DIASTOLIC BLOOD PRESSURE: 81 MMHG | HEIGHT: 70 IN | SYSTOLIC BLOOD PRESSURE: 137 MMHG | WEIGHT: 204.38 LBS | TEMPERATURE: 98 F | OXYGEN SATURATION: 98 %

## 2021-02-01 DIAGNOSIS — D12.8 RECTAL ADENOMA: ICD-10-CM

## 2021-02-01 DIAGNOSIS — C21.0 ANAL CANCER: Primary | ICD-10-CM

## 2021-02-01 PROCEDURE — 3008F BODY MASS INDEX DOCD: CPT | Mod: CPTII,S$GLB,, | Performed by: COLON & RECTAL SURGERY

## 2021-02-01 PROCEDURE — 99999 PR PBB SHADOW E&M-EST. PATIENT-LVL IV: ICD-10-PCS | Mod: PBBFAC,,, | Performed by: COLON & RECTAL SURGERY

## 2021-02-01 PROCEDURE — 3008F PR BODY MASS INDEX (BMI) DOCUMENTED: ICD-10-PCS | Mod: CPTII,S$GLB,, | Performed by: COLON & RECTAL SURGERY

## 2021-02-01 PROCEDURE — 99024 POSTOP FOLLOW-UP VISIT: CPT | Mod: S$GLB,,, | Performed by: COLON & RECTAL SURGERY

## 2021-02-01 PROCEDURE — 1126F PR PAIN SEVERITY QUANTIFIED, NO PAIN PRESENT: ICD-10-PCS | Mod: S$GLB,,, | Performed by: COLON & RECTAL SURGERY

## 2021-02-01 PROCEDURE — 99024 PR POST-OP FOLLOW-UP VISIT: ICD-10-PCS | Mod: S$GLB,,, | Performed by: COLON & RECTAL SURGERY

## 2021-02-01 PROCEDURE — 1126F AMNT PAIN NOTED NONE PRSNT: CPT | Mod: S$GLB,,, | Performed by: COLON & RECTAL SURGERY

## 2021-02-01 PROCEDURE — 99999 PR PBB SHADOW E&M-EST. PATIENT-LVL IV: CPT | Mod: PBBFAC,,, | Performed by: COLON & RECTAL SURGERY

## 2021-02-02 ENCOUNTER — TELEPHONE (OUTPATIENT)
Dept: GASTROENTEROLOGY | Facility: CLINIC | Age: 51
End: 2021-02-02

## 2021-02-02 DIAGNOSIS — Z11.52 ENCOUNTER FOR PREOPERATIVE SCREENING LABORATORY TESTING FOR COVID-19 VIRUS: ICD-10-CM

## 2021-02-02 DIAGNOSIS — Z01.812 ENCOUNTER FOR PREOPERATIVE SCREENING LABORATORY TESTING FOR COVID-19 VIRUS: ICD-10-CM

## 2021-02-17 ENCOUNTER — OFFICE VISIT (OUTPATIENT)
Dept: SURGERY | Facility: CLINIC | Age: 51
End: 2021-02-17
Payer: COMMERCIAL

## 2021-02-17 VITALS
SYSTOLIC BLOOD PRESSURE: 126 MMHG | DIASTOLIC BLOOD PRESSURE: 72 MMHG | HEART RATE: 76 BPM | WEIGHT: 206.19 LBS | HEIGHT: 70 IN | BODY MASS INDEX: 29.52 KG/M2

## 2021-02-17 DIAGNOSIS — Z48.89 ENCOUNTER FOR POST SURGICAL WOUND CHECK: Primary | ICD-10-CM

## 2021-02-17 PROCEDURE — 99999 PR PBB SHADOW E&M-EST. PATIENT-LVL III: CPT | Mod: PBBFAC,,, | Performed by: COLON & RECTAL SURGERY

## 2021-02-17 PROCEDURE — 3008F BODY MASS INDEX DOCD: CPT | Mod: CPTII,S$GLB,, | Performed by: COLON & RECTAL SURGERY

## 2021-02-17 PROCEDURE — 99999 PR PBB SHADOW E&M-EST. PATIENT-LVL III: ICD-10-PCS | Mod: PBBFAC,,, | Performed by: COLON & RECTAL SURGERY

## 2021-02-17 PROCEDURE — 99024 POSTOP FOLLOW-UP VISIT: CPT | Mod: S$GLB,,, | Performed by: COLON & RECTAL SURGERY

## 2021-02-17 PROCEDURE — 1126F PR PAIN SEVERITY QUANTIFIED, NO PAIN PRESENT: ICD-10-PCS | Mod: S$GLB,,, | Performed by: COLON & RECTAL SURGERY

## 2021-02-17 PROCEDURE — 1126F AMNT PAIN NOTED NONE PRSNT: CPT | Mod: S$GLB,,, | Performed by: COLON & RECTAL SURGERY

## 2021-02-17 PROCEDURE — 3008F PR BODY MASS INDEX (BMI) DOCUMENTED: ICD-10-PCS | Mod: CPTII,S$GLB,, | Performed by: COLON & RECTAL SURGERY

## 2021-02-17 PROCEDURE — 99024 PR POST-OP FOLLOW-UP VISIT: ICD-10-PCS | Mod: S$GLB,,, | Performed by: COLON & RECTAL SURGERY

## 2021-04-30 ENCOUNTER — LAB VISIT (OUTPATIENT)
Dept: FAMILY MEDICINE | Facility: CLINIC | Age: 51
End: 2021-04-30
Payer: COMMERCIAL

## 2021-04-30 DIAGNOSIS — Z01.812 ENCOUNTER FOR PREOPERATIVE SCREENING LABORATORY TESTING FOR COVID-19 VIRUS: ICD-10-CM

## 2021-04-30 DIAGNOSIS — Z11.52 ENCOUNTER FOR PREOPERATIVE SCREENING LABORATORY TESTING FOR COVID-19 VIRUS: ICD-10-CM

## 2021-04-30 PROCEDURE — U0003 INFECTIOUS AGENT DETECTION BY NUCLEIC ACID (DNA OR RNA); SEVERE ACUTE RESPIRATORY SYNDROME CORONAVIRUS 2 (SARS-COV-2) (CORONAVIRUS DISEASE [COVID-19]), AMPLIFIED PROBE TECHNIQUE, MAKING USE OF HIGH THROUGHPUT TECHNOLOGIES AS DESCRIBED BY CMS-2020-01-R: HCPCS | Performed by: COLON & RECTAL SURGERY

## 2021-04-30 PROCEDURE — U0005 INFEC AGEN DETEC AMPLI PROBE: HCPCS | Performed by: COLON & RECTAL SURGERY

## 2021-05-01 LAB — SARS-COV-2 RNA RESP QL NAA+PROBE: NOT DETECTED

## 2021-05-03 ENCOUNTER — ANESTHESIA EVENT (OUTPATIENT)
Dept: ENDOSCOPY | Facility: HOSPITAL | Age: 51
End: 2021-05-03
Payer: COMMERCIAL

## 2021-05-03 ENCOUNTER — HOSPITAL ENCOUNTER (OUTPATIENT)
Facility: HOSPITAL | Age: 51
Discharge: HOME | End: 2021-05-03
Attending: COLON & RECTAL SURGERY | Admitting: COLON & RECTAL SURGERY
Payer: COMMERCIAL

## 2021-05-03 ENCOUNTER — ANESTHESIA (OUTPATIENT)
Dept: ENDOSCOPY | Facility: HOSPITAL | Age: 51
End: 2021-05-03
Payer: COMMERCIAL

## 2021-05-03 VITALS
WEIGHT: 195 LBS | DIASTOLIC BLOOD PRESSURE: 63 MMHG | RESPIRATION RATE: 15 BRPM | HEIGHT: 70 IN | TEMPERATURE: 98 F | HEART RATE: 64 BPM | OXYGEN SATURATION: 98 % | SYSTOLIC BLOOD PRESSURE: 111 MMHG | BODY MASS INDEX: 27.92 KG/M2

## 2021-05-03 DIAGNOSIS — Z85.038 PERSONAL HISTORY OF COLON CANCER: ICD-10-CM

## 2021-05-03 DIAGNOSIS — Z85.048 HISTORY OF RECTAL CANCER: Primary | ICD-10-CM

## 2021-05-03 LAB — GLUCOSE SERPL-MCNC: 182 MG/DL (ref 70–110)

## 2021-05-03 PROCEDURE — D9220A PRA ANESTHESIA: ICD-10-PCS | Mod: ,,, | Performed by: NURSE ANESTHETIST, CERTIFIED REGISTERED

## 2021-05-03 PROCEDURE — 82962 GLUCOSE BLOOD TEST: CPT | Mod: PO | Performed by: COLON & RECTAL SURGERY

## 2021-05-03 PROCEDURE — 37000009 HC ANESTHESIA EA ADD 15 MINS: Mod: PO | Performed by: COLON & RECTAL SURGERY

## 2021-05-03 PROCEDURE — 37000008 HC ANESTHESIA 1ST 15 MINUTES: Mod: PO | Performed by: COLON & RECTAL SURGERY

## 2021-05-03 PROCEDURE — D9220A PRA ANESTHESIA: Mod: ,,, | Performed by: NURSE ANESTHETIST, CERTIFIED REGISTERED

## 2021-05-03 PROCEDURE — 25000003 PHARM REV CODE 250: Mod: PO | Performed by: NURSE ANESTHETIST, CERTIFIED REGISTERED

## 2021-05-03 PROCEDURE — 45378 DIAGNOSTIC COLONOSCOPY: CPT | Mod: PO | Performed by: COLON & RECTAL SURGERY

## 2021-05-03 PROCEDURE — D9220A PRA ANESTHESIA: Mod: ,,, | Performed by: ANESTHESIOLOGY

## 2021-05-03 PROCEDURE — G0104 PR CA SCREEN;FLEXI SIGMOIDSCOPE: ICD-10-PCS | Mod: ,,, | Performed by: COLON & RECTAL SURGERY

## 2021-05-03 PROCEDURE — D9220A PRA ANESTHESIA: ICD-10-PCS | Mod: ,,, | Performed by: ANESTHESIOLOGY

## 2021-05-03 PROCEDURE — 63600175 PHARM REV CODE 636 W HCPCS: Mod: PO | Performed by: ANESTHESIOLOGY

## 2021-05-03 PROCEDURE — G0104 CA SCREEN;FLEXI SIGMOIDSCOPE: HCPCS | Mod: ,,, | Performed by: COLON & RECTAL SURGERY

## 2021-05-03 PROCEDURE — 63600175 PHARM REV CODE 636 W HCPCS: Mod: PO | Performed by: NURSE ANESTHETIST, CERTIFIED REGISTERED

## 2021-05-03 PROCEDURE — 45330 DIAGNOSTIC SIGMOIDOSCOPY: CPT | Mod: PO | Performed by: COLON & RECTAL SURGERY

## 2021-05-03 RX ORDER — SODIUM CHLORIDE, SODIUM LACTATE, POTASSIUM CHLORIDE, CALCIUM CHLORIDE 600; 310; 30; 20 MG/100ML; MG/100ML; MG/100ML; MG/100ML
INJECTION, SOLUTION INTRAVENOUS CONTINUOUS
Status: DISCONTINUED | OUTPATIENT
Start: 2021-05-03 | End: 2021-05-03 | Stop reason: HOSPADM

## 2021-05-03 RX ORDER — PROPOFOL 10 MG/ML
VIAL (ML) INTRAVENOUS
Status: DISCONTINUED | OUTPATIENT
Start: 2021-05-03 | End: 2021-05-03

## 2021-05-03 RX ORDER — LIDOCAINE HCL/PF 100 MG/5ML
SYRINGE (ML) INTRAVENOUS
Status: DISCONTINUED | OUTPATIENT
Start: 2021-05-03 | End: 2021-05-03

## 2021-05-03 RX ORDER — LIDOCAINE HYDROCHLORIDE 10 MG/ML
1 INJECTION, SOLUTION EPIDURAL; INFILTRATION; INTRACAUDAL; PERINEURAL ONCE
Status: DISCONTINUED | OUTPATIENT
Start: 2021-05-03 | End: 2021-05-03 | Stop reason: HOSPADM

## 2021-05-03 RX ADMIN — PROPOFOL 175 MG: 10 INJECTION, EMULSION INTRAVENOUS at 07:05

## 2021-05-03 RX ADMIN — LIDOCAINE HYDROCHLORIDE 100 MG: 20 INJECTION, SOLUTION INTRAVENOUS at 07:05

## 2021-05-03 RX ADMIN — PROPOFOL 25 MG: 10 INJECTION, EMULSION INTRAVENOUS at 07:05

## 2021-05-03 RX ADMIN — SODIUM CHLORIDE, SODIUM LACTATE, POTASSIUM CHLORIDE, AND CALCIUM CHLORIDE: .6; .31; .03; .02 INJECTION, SOLUTION INTRAVENOUS at 06:05

## 2021-05-03 RX ADMIN — PROPOFOL 50 MG: 10 INJECTION, EMULSION INTRAVENOUS at 07:05

## 2024-10-23 ENCOUNTER — PATIENT MESSAGE (OUTPATIENT)
Dept: RESEARCH | Facility: HOSPITAL | Age: 54
End: 2024-10-23
Payer: COMMERCIAL

## 2024-11-18 ENCOUNTER — OFFICE VISIT (OUTPATIENT)
Dept: PRIMARY CARE CLINIC | Facility: CLINIC | Age: 54
End: 2024-11-18
Payer: COMMERCIAL

## 2024-11-18 VITALS
BODY MASS INDEX: 26.55 KG/M2 | WEIGHT: 185.44 LBS | HEIGHT: 70 IN | HEART RATE: 100 BPM | DIASTOLIC BLOOD PRESSURE: 70 MMHG | SYSTOLIC BLOOD PRESSURE: 168 MMHG

## 2024-11-18 DIAGNOSIS — E29.1 HYPOGONADISM IN MALE: ICD-10-CM

## 2024-11-18 DIAGNOSIS — F41.9 ANXIETY: ICD-10-CM

## 2024-11-18 DIAGNOSIS — Z12.5 SCREENING FOR MALIGNANT NEOPLASM OF PROSTATE: ICD-10-CM

## 2024-11-18 DIAGNOSIS — E11.9 TYPE 2 DIABETES MELLITUS WITHOUT COMPLICATION, WITHOUT LONG-TERM CURRENT USE OF INSULIN: Primary | Chronic | ICD-10-CM

## 2024-11-18 DIAGNOSIS — G47.00 INSOMNIA, UNSPECIFIED TYPE: ICD-10-CM

## 2024-11-18 LAB
ALBUMIN SERPL BCP-MCNC: 4.2 G/DL (ref 3.5–5.2)
ALP SERPL-CCNC: 71 U/L (ref 40–150)
ALT SERPL W/O P-5'-P-CCNC: 11 U/L (ref 10–44)
ANION GAP SERPL CALC-SCNC: 10 MMOL/L (ref 8–16)
AST SERPL-CCNC: 14 U/L (ref 10–40)
BASOPHILS # BLD AUTO: 0.06 K/UL (ref 0–0.2)
BASOPHILS NFR BLD: 0.6 % (ref 0–1.9)
BILIRUB SERPL-MCNC: 0.5 MG/DL (ref 0.1–1)
BUN SERPL-MCNC: 13 MG/DL (ref 6–20)
CALCIUM SERPL-MCNC: 9 MG/DL (ref 8.7–10.5)
CHLORIDE SERPL-SCNC: 106 MMOL/L (ref 95–110)
CO2 SERPL-SCNC: 24 MMOL/L (ref 23–29)
COMPLEXED PSA SERPL-MCNC: 0.57 NG/ML (ref 0–4)
CREAT SERPL-MCNC: 0.8 MG/DL (ref 0.5–1.4)
DIFFERENTIAL METHOD BLD: NORMAL
EOSINOPHIL # BLD AUTO: 0.4 K/UL (ref 0–0.5)
EOSINOPHIL NFR BLD: 3.9 % (ref 0–8)
ERYTHROCYTE [DISTWIDTH] IN BLOOD BY AUTOMATED COUNT: 12.9 % (ref 11.5–14.5)
EST. GFR  (NO RACE VARIABLE): >60 ML/MIN/1.73 M^2
ESTIMATED AVG GLUCOSE: 151 MG/DL (ref 68–131)
GLUCOSE SERPL-MCNC: 148 MG/DL (ref 70–110)
HBA1C MFR BLD: 6.9 % (ref 4–5.6)
HCT VFR BLD AUTO: 42.4 % (ref 40–54)
HGB BLD-MCNC: 14.1 G/DL (ref 14–18)
IMM GRANULOCYTES # BLD AUTO: 0.03 K/UL (ref 0–0.04)
IMM GRANULOCYTES NFR BLD AUTO: 0.3 % (ref 0–0.5)
LYMPHOCYTES # BLD AUTO: 3 K/UL (ref 1–4.8)
LYMPHOCYTES NFR BLD: 29.8 % (ref 18–48)
MCH RBC QN AUTO: 29.7 PG (ref 27–31)
MCHC RBC AUTO-ENTMCNC: 33.3 G/DL (ref 32–36)
MCV RBC AUTO: 89 FL (ref 82–98)
MONOCYTES # BLD AUTO: 0.5 K/UL (ref 0.3–1)
MONOCYTES NFR BLD: 4.7 % (ref 4–15)
NEUTROPHILS # BLD AUTO: 6 K/UL (ref 1.8–7.7)
NEUTROPHILS NFR BLD: 60.7 % (ref 38–73)
NRBC BLD-RTO: 0 /100 WBC
PLATELET # BLD AUTO: 227 K/UL (ref 150–450)
PMV BLD AUTO: 11 FL (ref 9.2–12.9)
POTASSIUM SERPL-SCNC: 4.1 MMOL/L (ref 3.5–5.1)
PROT SERPL-MCNC: 7.1 G/DL (ref 6–8.4)
RBC # BLD AUTO: 4.75 M/UL (ref 4.6–6.2)
SODIUM SERPL-SCNC: 140 MMOL/L (ref 136–145)
TESTOST SERPL-MCNC: 215 NG/DL (ref 304–1227)
WBC # BLD AUTO: 9.89 K/UL (ref 3.9–12.7)

## 2024-11-18 PROCEDURE — 3077F SYST BP >= 140 MM HG: CPT | Mod: CPTII,S$GLB,, | Performed by: PHYSICIAN ASSISTANT

## 2024-11-18 PROCEDURE — 99203 OFFICE O/P NEW LOW 30 MIN: CPT | Mod: S$GLB,,, | Performed by: PHYSICIAN ASSISTANT

## 2024-11-18 PROCEDURE — 36415 COLL VENOUS BLD VENIPUNCTURE: CPT | Mod: S$GLB,,, | Performed by: PHYSICIAN ASSISTANT

## 2024-11-18 PROCEDURE — 1160F RVW MEDS BY RX/DR IN RCRD: CPT | Mod: CPTII,S$GLB,, | Performed by: PHYSICIAN ASSISTANT

## 2024-11-18 PROCEDURE — 84153 ASSAY OF PSA TOTAL: CPT | Performed by: PHYSICIAN ASSISTANT

## 2024-11-18 PROCEDURE — 83036 HEMOGLOBIN GLYCOSYLATED A1C: CPT | Performed by: PHYSICIAN ASSISTANT

## 2024-11-18 PROCEDURE — 84403 ASSAY OF TOTAL TESTOSTERONE: CPT | Performed by: PHYSICIAN ASSISTANT

## 2024-11-18 PROCEDURE — 80053 COMPREHEN METABOLIC PANEL: CPT | Performed by: PHYSICIAN ASSISTANT

## 2024-11-18 PROCEDURE — 3008F BODY MASS INDEX DOCD: CPT | Mod: CPTII,S$GLB,, | Performed by: PHYSICIAN ASSISTANT

## 2024-11-18 PROCEDURE — 1159F MED LIST DOCD IN RCRD: CPT | Mod: CPTII,S$GLB,, | Performed by: PHYSICIAN ASSISTANT

## 2024-11-18 PROCEDURE — 82671 ASSAY OF ESTROGENS: CPT | Performed by: PHYSICIAN ASSISTANT

## 2024-11-18 PROCEDURE — 85025 COMPLETE CBC W/AUTO DIFF WBC: CPT | Performed by: PHYSICIAN ASSISTANT

## 2024-11-18 PROCEDURE — 3078F DIAST BP <80 MM HG: CPT | Mod: CPTII,S$GLB,, | Performed by: PHYSICIAN ASSISTANT

## 2024-11-18 RX ORDER — TESTOSTERONE CYPIONATE 200 MG/ML
200 INJECTION, SOLUTION INTRAMUSCULAR
Qty: 10 ML | Refills: 0 | Status: SHIPPED | OUTPATIENT
Start: 2024-11-18

## 2024-11-18 RX ORDER — ONDANSETRON 8 MG/1
8 TABLET, ORALLY DISINTEGRATING ORAL 3 TIMES DAILY
Qty: 30 TABLET | Refills: 0 | Status: SHIPPED | OUTPATIENT
Start: 2024-11-18

## 2024-11-18 RX ORDER — TEMAZEPAM 30 MG/1
30 CAPSULE ORAL NIGHTLY PRN
Qty: 30 CAPSULE | Refills: 3 | Status: SHIPPED | OUTPATIENT
Start: 2024-11-18 | End: 2025-03-18

## 2024-11-18 RX ORDER — OXYCODONE AND ACETAMINOPHEN TABLETS 10; 300 MG/1; MG/1
1 TABLET ORAL 3 TIMES DAILY PRN
COMMUNITY

## 2024-11-18 RX ORDER — SERTRALINE HYDROCHLORIDE 50 MG/1
50 TABLET, FILM COATED ORAL DAILY
Qty: 90 TABLET | Refills: 1 | Status: SHIPPED | OUTPATIENT
Start: 2024-11-18 | End: 2025-11-18

## 2024-11-18 RX ORDER — CYANOCOBALAMIN 1000 UG/ML
1000 INJECTION, SOLUTION INTRAMUSCULAR; SUBCUTANEOUS
COMMUNITY

## 2024-11-18 RX ORDER — TEMAZEPAM 15 MG/1
15 CAPSULE ORAL NIGHTLY PRN
COMMUNITY
End: 2024-11-18

## 2024-11-18 NOTE — PROGRESS NOTES
Subjective     Patient ID: Alexi Gayle is a 54 y.o. male.    Chief Complaint: Establish Care and DECREASED APPETITE (Causing wt loss. Sees Dr Hawkins, GI. Had CT this AM and has endoscopy and cscope sched for Thurs)    Patient is a 53 yo male who comes in today for an establish care visit. He has multiple ongoing medical problems. He have been suffering with chronic nausea. He is being followed by Dr Hawkins and is scheduled to have a EGD/colonoscopy    Patient Active Problem List:     Spinal stenosis of lumbar region     Diabetes mellitus     Tobacco abuse     Urinary retention     Fever     Post-operative pain     Constipation     Lumbar radiculopathy     Anal pain     Malignant neoplasm of rectum     Anal cancer     Chronic posterior anal fissure     History of rectal cancer     Personal history of colon cancer     Anxiety     Hypogonadism in male     Insomnia        Anxiety  Presents for initial visit. Onset was at an unknown time. The problem has been waxing and waning. Symptoms include decreased concentration, excessive worry, insomnia, irritability, nausea, nervous/anxious behavior and restlessness. Patient reports no chest pain or shortness of breath. Symptoms occur constantly. The severity of symptoms is moderate. The quality of sleep is fair. Nighttime awakenings: several.     Past treatments include nothing.   Diabetes  He presents for his follow-up diabetic visit. He has type 2 diabetes mellitus. His disease course has been stable. Hypoglycemia symptoms include nervousness/anxiousness. Pertinent negatives for diabetes include no chest pain and no fatigue. There are no hypoglycemic complications. Symptoms are stable. There are no diabetic complications. Risk factors for coronary artery disease include diabetes mellitus, male sex, stress and tobacco exposure. When asked about current treatments, none were reported. His weight is decreasing steadily. He is following a generally healthy diet. When asked  about meal planning, he reported none. He has not had a previous visit with a dietitian. He rarely participates in exercise. His home blood glucose trend is fluctuating minimally. An ACE inhibitor/angiotensin II receptor blocker is not being taken.     Past Medical History:   Diagnosis Date    DDD (degenerative disc disease), lumbar     Diabetes mellitus     Malignant neoplasm of rectum 10/21/2020    BARON on CPAP     Renal calculi     Spinal stenosis of lumbar region        Past Surgical History:   Procedure Laterality Date    APPENDECTOMY  12/27/89    BACK SURGERY  8/2000    removed piece of bone cartilage from sciatic nerve area    ENDOSCOPIC ULTRASOUND OF LOWER GASTROINTESTINAL TRACT Left 10/21/2020    Procedure: ULTRASOUND, LOWER GI TRACT, ENDOSCOPIC;  Surgeon: Gary Randall MD;  Location: [facility] ENDO;  Service: Endoscopy;  Laterality: Left;  Gif / Radial    EXAMINATION UNDER ANESTHESIA N/A 8/28/2020    Procedure: Exam under anesthesia -DILATION, RECTUM;  Surgeon: Eagle Tirado MD;  Location: [facility] OR;  Service: General;  Laterality: N/A;    FLEXIBLE SIGMOIDOSCOPY Left 10/21/2020    Procedure: SIGMOIDOSCOPY, FLEXIBLE;  Surgeon: Gary Randall MD;  Location: [facility] ENDO;  Service: Endoscopy;  Laterality: Left;    FLEXIBLE SIGMOIDOSCOPY N/A 1/4/2021    Procedure: SIGMOIDOSCOPY, FLEXIBLE;  Surgeon: ERICA Olivarez MD;  Location: [facility];  Service: Colon and Rectal;  Laterality: N/A;    FLEXIBLE SIGMOIDOSCOPY  1/21/2021    Procedure: SIGMOIDOSCOPY, FLEXIBLE;  Surgeon: ERICA Olivarez MD;  Location: [address];  Service: Colon and Rectal;;    FLEXIBLE SIGMOIDOSCOPY N/A 5/3/2021    Procedure: SIGMOIDOSCOPY, FLEXIBLE;  Surgeon: ERICA Olivarez MD;  Location: [facility];  Service: Colon and Rectal;  Laterality: N/A;    GALLBLADDER SURGERY  2011    KIDNEY STONE SURGERY  1/2013    LUMBAR EPIDURAL INJECTION  6/2015    MYELOGRAPHY N/A 10/31/2018    Procedure: Myelogram lumbar;  Surgeon: Minesh  YE Infante MD;  Location: [facility];  Service: Radiology;  Laterality: N/A;    NOSE SURGERY  1991    TRANSANAL MINIMALLY INVASIVE SURGERY (TAMIS) N/A 1/21/2021    Procedure: TAMIS (TRANSANAL MINIMALLY INVASIVE SURGERY);  Surgeon: ERICA Olivarez MD;  Location: [facility] OR [address] [place] [address];  Service: Colon and Rectal;  Laterality: N/A;       Family History   Problem Relation Name Age of Onset    Diabetes Mother      Heart disease Father      Cancer Father      Diabetes Father      Kidney disease Father      Hypertension Father      Diabetes Brother         Social History     Socioeconomic History    Marital status:    Tobacco Use    Smoking status: Every Day     Current packs/day: 0.50     Average packs/day: 0.5 packs/day for 30.0 years (15.0 ttl pk-yrs)     Types: Cigarettes     Start date: 11/17/1994    Smokeless tobacco: Never   Substance and Sexual Activity    Alcohol use: No    Drug use: Yes     Types: Marijuana     Comment: nightly       Review of patient's allergies indicates:   Allergen Reactions    Hydrocodone Nausea Only         Current Outpatient Medications:     cyanocobalamin 1,000 mcg/mL injection, Inject 1,000 mcg into the muscle every 28 days., Disp: , Rfl:     Lactobacillus rhamnosus GG (CULTURELLE) 10 billion cell capsule, Take 1 capsule by mouth once daily., Disp: , Rfl:     metFORMIN (GLUCOPHAGE-XR) 500 MG ER 24hr tablet, Take 1,000 mg by mouth 2 (two) times daily with meals. , Disp: , Rfl:     oxyCODONE-acetaminophen (LYNOX)  mg per tablet, Take 1 tablet by mouth 3 (three) times daily as needed for Pain., Disp: , Rfl:     sildenafiL (VIAGRA) 100 MG tablet, Take 100 mg by mouth as needed., Disp: , Rfl:     ondansetron (ZOFRAN-ODT) 8 MG TbDL, Take 1 tablet (8 mg total) by mouth 3 (three) times daily., Disp: 30 tablet, Rfl: 0    sertraline (ZOLOFT) 50 MG tablet, Take 1 tablet (50 mg total) by mouth once daily., Disp: 90 tablet, Rfl: 1    temazepam (RESTORIL) 30 mg capsule, Take 1 capsule (30 mg  "total) by mouth nightly as needed for Insomnia., Disp: 30 capsule, Rfl: 3    testosterone cypionate (DEPOTESTOTERONE CYPIONATE) 200 mg/mL injection, Inject 1 mL (200 mg total) into the muscle every 14 (fourteen) days., Disp: 10 mL, Rfl: 0    BP (!) 168/70   Pulse 100   Ht 5' 10" (1.778 m)   Wt 84.1 kg (185 lb 6.5 oz)   BMI 26.60 kg/m²   Review of Systems   Constitutional:  Positive for irritability. Negative for chills, fatigue and fever.   Respiratory:  Negative for chest tightness and shortness of breath.    Cardiovascular:  Negative for chest pain.   Gastrointestinal:  Positive for nausea.   Psychiatric/Behavioral:  Positive for decreased concentration. The patient is nervous/anxious and has insomnia.           Objective     Physical Exam  Vitals reviewed.   Constitutional:       General: He is not in acute distress.     Appearance: Normal appearance. He is not ill-appearing, toxic-appearing or diaphoretic.   Neck:      Vascular: No carotid bruit.   Cardiovascular:      Rate and Rhythm: Normal rate and regular rhythm.      Pulses: Normal pulses.      Heart sounds: Normal heart sounds.   Pulmonary:      Effort: Pulmonary effort is normal.      Breath sounds: Normal breath sounds.   Abdominal:          Comments: Mild hernia present   Musculoskeletal:      Cervical back: Normal range of motion. No rigidity or tenderness.   Lymphadenopathy:      Cervical: No cervical adenopathy.   Neurological:      Mental Status: He is alert.            Assessment and Plan     1. Type 2 diabetes mellitus without complication, without long-term current use of insulin  -     Comprehensive Metabolic Panel; Future; Expected date: 11/18/2024  -     Hemoglobin A1C; Future; Expected date: 11/18/2024  -     CBC Auto Differential; Future; Expected date: 11/18/2024    2. Anxiety  sertraline (ZOLOFT) 50 MG tablet; Take 1 tablet (50 mg total) by mouth once daily.  Dispense: 90 tablet; Refill: 1    3. Hypogonadism in male  -     " TESTOSTERONE; Future; Expected date: 11/18/2024  -     Estrogens, Total; Future; Expected date: 11/18/2024  -     testosterone cypionate (DEPOTESTOTERONE CYPIONATE) 200 mg/mL injection; Inject 1 mL (200 mg total) into the muscle every 14 (fourteen) days.  Dispense: 10 mL; Refill: 0    4. Insomnia, unspecified type  -     temazepam (RESTORIL) 30 mg capsule; Take 1 capsule (30 mg total) by mouth nightly as needed for Insomnia.  Dispense: 30 capsule; Refill: 3    5. Screening for malignant neoplasm of prostate  -     PSA, Screening; Future; Expected date: 11/18/2024    Other orders  -     ondansetron (ZOFRAN-ODT) 8 MG TbDL; Take 1 tablet (8 mg total) by mouth 3 (three) times daily.  Dispense: 30 tablet; Refill: 0  -         I spent 30 minutes on this encounter, time includes face-to-face, chart review, documentation, test review and orders.           Follow up in about 4 weeks (around 12/16/2024).

## 2024-11-21 RX ORDER — BUSPIRONE HYDROCHLORIDE 30 MG/1
30 TABLET ORAL 3 TIMES DAILY
Qty: 90 TABLET | Refills: 11 | Status: SHIPPED | OUTPATIENT
Start: 2024-11-21 | End: 2025-11-21

## 2024-11-22 ENCOUNTER — TELEPHONE (OUTPATIENT)
Dept: PRIMARY CARE CLINIC | Facility: CLINIC | Age: 54
End: 2024-11-22
Payer: COMMERCIAL

## 2024-11-22 DIAGNOSIS — E11.9 TYPE 2 DIABETES MELLITUS WITHOUT COMPLICATION, WITHOUT LONG-TERM CURRENT USE OF INSULIN: Primary | ICD-10-CM

## 2024-11-22 LAB
ESTRADIOL SERPL HS-MCNC: 6 PG/ML (ref 10–42)
ESTROGEN SERPL CALC-MCNC: 18 PG/ML (ref 19–69)
ESTRONE SERPL-MCNC: 12 PG/ML (ref 9–36)

## 2024-11-22 NOTE — TELEPHONE ENCOUNTER
Pt aware of lab results. Eye exam referral faxed to Dr. Young per pt request, no other concerns were voiced

## 2024-11-22 NOTE — TELEPHONE ENCOUNTER
Pt aware of lab results, diabetic eye referral was faxed to Dr. Young per pt request. No other concerns were voiced

## 2024-11-22 NOTE — TELEPHONE ENCOUNTER
----- Message from Alex Alanis PA-C sent at 11/22/2024  1:46 PM CST -----  Needs to have his BP checked in 2 weeks  ----- Message -----  From: Mike, [employer] [facility]  Sent: 11/18/2024   7:08 PM CST  To: Alex Alanis III, PA-C

## 2024-11-22 NOTE — TELEPHONE ENCOUNTER
----- Message from Alex Alanis PA-C sent at 11/22/2024  1:45 PM CST -----  Cecilio,     I reviwed your lab work: 1) Your HAIC is at 6.9 which our goal is to keep you under 7.0    2) Your testosterone level is low. I recommend getting back on your injections if you have not been taking them    3) You are also due for a diabetic eye exam. I have placed a referral for you to have one.

## 2024-12-12 ENCOUNTER — OFFICE VISIT (OUTPATIENT)
Dept: PRIMARY CARE CLINIC | Facility: CLINIC | Age: 54
End: 2024-12-12
Payer: COMMERCIAL

## 2024-12-12 ENCOUNTER — PATIENT MESSAGE (OUTPATIENT)
Dept: PRIMARY CARE CLINIC | Facility: CLINIC | Age: 54
End: 2024-12-12

## 2024-12-12 VITALS — HEIGHT: 70 IN | BODY MASS INDEX: 26.29 KG/M2 | WEIGHT: 183.63 LBS

## 2024-12-12 DIAGNOSIS — R07.89 ATYPICAL CHEST PAIN: Primary | ICD-10-CM

## 2024-12-12 DIAGNOSIS — G47.00 INSOMNIA, UNSPECIFIED TYPE: ICD-10-CM

## 2024-12-12 DIAGNOSIS — I10 HYPERTENSION, UNSPECIFIED TYPE: ICD-10-CM

## 2024-12-12 DIAGNOSIS — E11.9 TYPE 2 DIABETES MELLITUS WITHOUT COMPLICATION, WITHOUT LONG-TERM CURRENT USE OF INSULIN: ICD-10-CM

## 2024-12-12 PROCEDURE — 3044F HG A1C LEVEL LT 7.0%: CPT | Mod: CPTII,S$GLB,, | Performed by: PHYSICIAN ASSISTANT

## 2024-12-12 PROCEDURE — 1159F MED LIST DOCD IN RCRD: CPT | Mod: CPTII,S$GLB,, | Performed by: PHYSICIAN ASSISTANT

## 2024-12-12 PROCEDURE — 99214 OFFICE O/P EST MOD 30 MIN: CPT | Mod: S$GLB,,, | Performed by: PHYSICIAN ASSISTANT

## 2024-12-12 PROCEDURE — 3008F BODY MASS INDEX DOCD: CPT | Mod: CPTII,S$GLB,, | Performed by: PHYSICIAN ASSISTANT

## 2024-12-12 PROCEDURE — 4010F ACE/ARB THERAPY RXD/TAKEN: CPT | Mod: CPTII,S$GLB,, | Performed by: PHYSICIAN ASSISTANT

## 2024-12-12 RX ORDER — LOSARTAN POTASSIUM 50 MG/1
50 TABLET ORAL DAILY
Qty: 90 TABLET | Refills: 3 | Status: SHIPPED | OUTPATIENT
Start: 2024-12-12 | End: 2025-12-12

## 2024-12-12 RX ORDER — INSULIN PUMP SYRINGE, 3 ML
EACH MISCELLANEOUS
Qty: 1 EACH | Refills: 0 | Status: SHIPPED | OUTPATIENT
Start: 2024-12-12 | End: 2025-12-12

## 2024-12-12 RX ORDER — LANCETS
1 EACH MISCELLANEOUS DAILY
Qty: 100 EACH | Refills: 0 | Status: SHIPPED | OUTPATIENT
Start: 2024-12-12

## 2024-12-12 RX ORDER — CLONAZEPAM 1 MG/1
1 TABLET ORAL NIGHTLY
Qty: 30 TABLET | Refills: 3 | Status: SHIPPED | OUTPATIENT
Start: 2024-12-12 | End: 2025-04-11

## 2024-12-12 NOTE — PROGRESS NOTES
Subjective     Patient ID: Alexi Gayle is a 54 y.o. male.    Chief Complaint: No chief complaint on file.    Palpitations   This is a recurrent problem. The current episode started more than 1 month ago. The problem occurs intermittently. The problem has been waxing and waning. The symptoms are aggravated by stress. Associated symptoms include anxiety. Pertinent negatives include no chest pain or shortness of breath. He has tried nothing for the symptoms. The treatment provided no relief. Risk factors include being male, stress and diabetes mellitus.   Anxiety  Presents for follow-up visit. Symptoms include insomnia, nervous/anxious behavior and palpitations. Patient reports no chest pain or shortness of breath. The severity of symptoms is severe. The quality of sleep is poor.     Compliance with medications is 51-75%.     Review of Systems   Respiratory:  Negative for chest tightness and shortness of breath.    Cardiovascular:  Positive for palpitations. Negative for chest pain.   Gastrointestinal:  Negative for abdominal pain.   Psychiatric/Behavioral:  The patient is nervous/anxious and has insomnia.           Objective     Physical Exam  Vitals reviewed.   Constitutional:       General: He is not in acute distress.     Appearance: Normal appearance. He is not ill-appearing, toxic-appearing or diaphoretic.   Neck:      Vascular: No carotid bruit.   Cardiovascular:      Rate and Rhythm: Normal rate and regular rhythm.      Pulses: Normal pulses.      Heart sounds: Normal heart sounds. No murmur heard.     No friction rub. No gallop.   Pulmonary:      Effort: Pulmonary effort is normal. No respiratory distress.      Breath sounds: Normal breath sounds. No stridor. No wheezing, rhonchi or rales.   Chest:      Chest wall: No tenderness.   Abdominal:      General: Abdomen is flat.      Palpations: Abdomen is soft.      Tenderness: There is no abdominal tenderness.   Musculoskeletal:      Cervical back: No rigidity  or tenderness.   Lymphadenopathy:      Cervical: No cervical adenopathy.   Skin:     General: Skin is warm and dry.      Coloration: Skin is not jaundiced.   Neurological:      Mental Status: He is alert.   Psychiatric:         Mood and Affect: Mood normal.     Protective Sensation (w/ 10 gram monofilament):  Right: Intact  Left: Intact    Visual Inspection:  Normal -  Bilateral    Pedal Pulses:   Right: Present  Left: Present    Posterior Tibialis Pulses:   Right:Present  Left: Present        Assessment and Plan     1. Atypical chest pain  -     Ambulatory referral/consult to Cardiology; Future; Expected date: 12/19/2024    2. Insomnia, unspecified type  -     Ambulatory referral/consult to Sleep Disorders; Future; Expected date: 12/19/2024  -     clonazePAM (KLONOPIN) 1 MG tablet; Take 1 tablet (1 mg total) by mouth every evening.  Dispense: 30 tablet; Refill: 3    3. Type 2 diabetes mellitus without complication, without long-term current use of insulin  -     blood-glucose meter kit; Use as instructed  Dispense: 1 each; Refill: 0  -     blood sugar diagnostic, disc Strp; 1 strip by Misc.(Non-Drug; Combo Route) route once daily.  Dispense: 30 strip; Refill: 11  -     lancets (LANCETS, SUPER THIN) Misc; 1 Lancet by Misc.(Non-Drug; Combo Route) route once daily.  Dispense: 100 each; Refill: 0    4. Hypertension, unspecified type  -     losartan (COZAAR) 50 MG tablet; Take 1 tablet (50 mg total) by mouth once daily.  Dispense: 90 tablet; Refill: 3        I spent 30 minutes on this encounter, time includes face-to-face, chart review, documentation, test review and orders.           Follow up in about 3 months (around 3/12/2025).

## 2024-12-17 DIAGNOSIS — E11.9 TYPE 2 DIABETES MELLITUS WITHOUT COMPLICATION, WITHOUT LONG-TERM CURRENT USE OF INSULIN: ICD-10-CM

## 2024-12-17 RX ORDER — LANCETS 33 GAUGE
EACH MISCELLANEOUS
Qty: 100 EACH | Refills: 11 | Status: SHIPPED | OUTPATIENT
Start: 2024-12-17

## 2024-12-17 RX ORDER — DEXTROSE 4 G
TABLET,CHEWABLE ORAL
Qty: 1 EACH | Refills: 0 | Status: SHIPPED | OUTPATIENT
Start: 2024-12-17

## 2024-12-18 DIAGNOSIS — E11.9 TYPE 2 DIABETES MELLITUS WITHOUT COMPLICATION: ICD-10-CM

## 2024-12-30 ENCOUNTER — TELEPHONE (OUTPATIENT)
Dept: FAMILY MEDICINE | Facility: CLINIC | Age: 54
End: 2024-12-30
Payer: COMMERCIAL

## 2024-12-30 ENCOUNTER — CLINICAL SUPPORT (OUTPATIENT)
Dept: PRIMARY CARE CLINIC | Facility: CLINIC | Age: 54
End: 2024-12-30
Payer: COMMERCIAL

## 2024-12-30 DIAGNOSIS — I10 HYPERTENSION, UNSPECIFIED TYPE: Primary | ICD-10-CM

## 2024-12-30 NOTE — TELEPHONE ENCOUNTER
130/78 , 82    Dr. Alanis notified.    Alexi Gayle 54 y.o. male is here today for Blood Pressure check.   History of HTN yes.    Review of patient's allergies indicates:   Allergen Reactions    Hydrocodone Nausea Only     Creatinine   Date Value Ref Range Status   11/18/2024 0.8 0.5 - 1.4 mg/dL Final     Sodium   Date Value Ref Range Status   11/18/2024 140 136 - 145 mmol/L Final     Potassium   Date Value Ref Range Status   11/18/2024 4.1 3.5 - 5.1 mmol/L Final   ]  Patient verifies taking blood pressure medications on a regular basis at the same time of the day.     Current Outpatient Medications:     blood sugar diagnostic, disc Strp, 1 strip by Misc.(Non-Drug; Combo Route) route once daily., Disp: 30 strip, Rfl: 11    blood-glucose meter (ONETOUCH ULTRA2 METER) Misc, use as instructed, Disp: 1 each, Rfl: 0    clonazePAM (KLONOPIN) 1 MG tablet, Take 1 tablet (1 mg total) by mouth every evening., Disp: 30 tablet, Rfl: 3    cyanocobalamin 1,000 mcg/mL injection, Inject 1,000 mcg into the muscle every 28 days., Disp: , Rfl:     Lactobacillus rhamnosus GG (CULTURELLE) 10 billion cell capsule, Take 1 capsule by mouth once daily., Disp: , Rfl:     lancets (ONETOUCH DELICA PLUS LANCET) 33 gauge Misc, USE AS DIRECTED once daily., Disp: 100 each, Rfl: 11    losartan (COZAAR) 50 MG tablet, Take 1 tablet (50 mg total) by mouth once daily., Disp: 90 tablet, Rfl: 3    metFORMIN (GLUCOPHAGE-XR) 500 MG ER 24hr tablet, Take 1,000 mg by mouth 2 (two) times daily with meals. , Disp: , Rfl:     ondansetron (ZOFRAN-ODT) 8 MG TbDL, Take 1 tablet (8 mg total) by mouth 3 (three) times daily., Disp: 30 tablet, Rfl: 0    oxyCODONE-acetaminophen (LYNOX)  mg per tablet, Take 1 tablet by mouth 3 (three) times daily as needed for Pain., Disp: , Rfl:     sildenafiL (VIAGRA) 100 MG tablet, Take 100 mg by mouth as needed., Disp: , Rfl:     temazepam (RESTORIL) 30 mg capsule, Take 1 capsule (30 mg total) by mouth nightly as needed for  Insomnia., Disp: 30 capsule, Rfl: 3    testosterone cypionate (DEPOTESTOTERONE CYPIONATE) 200 mg/mL injection, Inject 1 mL (200 mg total) into the muscle every 14 (fourteen) days., Disp: 10 mL, Rfl: 0  Does patient have record of home blood pressure readings no. Readings have been averaging 130/70.   Last dose of blood pressure medication was taken lastnight.  Patient is asymptomatic.

## 2024-12-30 NOTE — PROGRESS NOTES
130/78 , 82    Dr. Alanis notified.    Alexi Gayle 54 y.o. male is here today for Blood Pressure check.   History of HTN yes.    Review of patient's allergies indicates:   Allergen Reactions    Hydrocodone Nausea Only     Creatinine   Date Value Ref Range Status   11/18/2024 0.8 0.5 - 1.4 mg/dL Final     Sodium   Date Value Ref Range Status   11/18/2024 140 136 - 145 mmol/L Final     Potassium   Date Value Ref Range Status   11/18/2024 4.1 3.5 - 5.1 mmol/L Final   ]  Patient verifies taking blood pressure medications on a regular basis at the same time of the day.     Current Outpatient Medications:     blood sugar diagnostic, disc Strp, 1 strip by Misc.(Non-Drug; Combo Route) route once daily., Disp: 30 strip, Rfl: 11    blood-glucose meter (ONETOUCH ULTRA2 METER) Misc, use as instructed, Disp: 1 each, Rfl: 0    clonazePAM (KLONOPIN) 1 MG tablet, Take 1 tablet (1 mg total) by mouth every evening., Disp: 30 tablet, Rfl: 3    cyanocobalamin 1,000 mcg/mL injection, Inject 1,000 mcg into the muscle every 28 days., Disp: , Rfl:     Lactobacillus rhamnosus GG (CULTURELLE) 10 billion cell capsule, Take 1 capsule by mouth once daily., Disp: , Rfl:     lancets (ONETOUCH DELICA PLUS LANCET) 33 gauge Misc, USE AS DIRECTED once daily., Disp: 100 each, Rfl: 11    losartan (COZAAR) 50 MG tablet, Take 1 tablet (50 mg total) by mouth once daily., Disp: 90 tablet, Rfl: 3    metFORMIN (GLUCOPHAGE-XR) 500 MG ER 24hr tablet, Take 1,000 mg by mouth 2 (two) times daily with meals. , Disp: , Rfl:     ondansetron (ZOFRAN-ODT) 8 MG TbDL, Take 1 tablet (8 mg total) by mouth 3 (three) times daily., Disp: 30 tablet, Rfl: 0    oxyCODONE-acetaminophen (LYNOX)  mg per tablet, Take 1 tablet by mouth 3 (three) times daily as needed for Pain., Disp: , Rfl:     sildenafiL (VIAGRA) 100 MG tablet, Take 100 mg by mouth as needed., Disp: , Rfl:     temazepam (RESTORIL) 30 mg capsule, Take 1 capsule (30 mg total) by mouth nightly as needed  for Insomnia., Disp: 30 capsule, Rfl: 3    testosterone cypionate (DEPOTESTOTERONE CYPIONATE) 200 mg/mL injection, Inject 1 mL (200 mg total) into the muscle every 14 (fourteen) days., Disp: 10 mL, Rfl: 0  Does patient have record of home blood pressure readings no. Readings have been averaging 130/70.   Last dose of blood pressure medication was taken lastnight.  Patient is asymptomatic.   Complains of nothing at this time.

## 2025-01-09 DIAGNOSIS — G47.00 INSOMNIA, UNSPECIFIED TYPE: ICD-10-CM

## 2025-01-09 RX ORDER — CLONAZEPAM 2 MG/1
2 TABLET ORAL NIGHTLY
Qty: 30 TABLET | Refills: 1 | Status: SHIPPED | OUTPATIENT
Start: 2025-01-09 | End: 2025-03-10

## 2025-01-10 ENCOUNTER — TELEPHONE (OUTPATIENT)
Dept: PRIMARY CARE CLINIC | Facility: CLINIC | Age: 55
End: 2025-01-10
Payer: COMMERCIAL

## 2025-01-15 ENCOUNTER — OFFICE VISIT (OUTPATIENT)
Dept: CARDIOLOGY | Facility: CLINIC | Age: 55
End: 2025-01-15
Payer: COMMERCIAL

## 2025-01-15 VITALS
DIASTOLIC BLOOD PRESSURE: 80 MMHG | BODY MASS INDEX: 27.05 KG/M2 | HEIGHT: 70 IN | WEIGHT: 188.94 LBS | SYSTOLIC BLOOD PRESSURE: 134 MMHG | HEART RATE: 89 BPM

## 2025-01-15 DIAGNOSIS — I10 PRIMARY HYPERTENSION: Chronic | ICD-10-CM

## 2025-01-15 DIAGNOSIS — Z72.0 TOBACCO ABUSE: Chronic | ICD-10-CM

## 2025-01-15 DIAGNOSIS — R01.1 SYSTOLIC MURMUR: ICD-10-CM

## 2025-01-15 DIAGNOSIS — R09.89 RIGHT CAROTID BRUIT: ICD-10-CM

## 2025-01-15 DIAGNOSIS — E78.5 HYPERLIPIDEMIA ASSOCIATED WITH TYPE 2 DIABETES MELLITUS: Chronic | ICD-10-CM

## 2025-01-15 DIAGNOSIS — R07.89 ATYPICAL CHEST PAIN: Primary | ICD-10-CM

## 2025-01-15 DIAGNOSIS — Z82.49 FAMILY HISTORY OF EARLY CAD: ICD-10-CM

## 2025-01-15 DIAGNOSIS — R00.2 PALPITATIONS: ICD-10-CM

## 2025-01-15 DIAGNOSIS — G47.33 OSA ON CPAP: Chronic | ICD-10-CM

## 2025-01-15 DIAGNOSIS — E11.69 HYPERLIPIDEMIA ASSOCIATED WITH TYPE 2 DIABETES MELLITUS: Chronic | ICD-10-CM

## 2025-01-15 PROCEDURE — 99204 OFFICE O/P NEW MOD 45 MIN: CPT | Mod: S$GLB,,, | Performed by: INTERNAL MEDICINE

## 2025-01-15 PROCEDURE — 1159F MED LIST DOCD IN RCRD: CPT | Mod: CPTII,S$GLB,, | Performed by: INTERNAL MEDICINE

## 2025-01-15 PROCEDURE — 3075F SYST BP GE 130 - 139MM HG: CPT | Mod: CPTII,S$GLB,, | Performed by: INTERNAL MEDICINE

## 2025-01-15 PROCEDURE — 3008F BODY MASS INDEX DOCD: CPT | Mod: CPTII,S$GLB,, | Performed by: INTERNAL MEDICINE

## 2025-01-15 PROCEDURE — 3079F DIAST BP 80-89 MM HG: CPT | Mod: CPTII,S$GLB,, | Performed by: INTERNAL MEDICINE

## 2025-01-15 NOTE — PROGRESS NOTES
Subjective:    Patient ID:  Alexi Gayle is a 54 y.o. male who presents for Chest Pain and Heart Problem        Chest Pain   Associated symptoms include palpitations. Pertinent negatives include no abdominal pain, back pain (MILD CHRONIC), claudication, cough, diaphoresis, dizziness (OCC), fever, hemoptysis, malaise/fatigue, nausea, near-syncope, orthopnea, PND, shortness of breath, syncope or weakness.     NEW PATIENT EVALUATION FOR CHEST PAIN, HAS HISTORY OF HYPERTENSION AND DIABETES, RECENT LABS HBA1C 6.9%, CMP OK WITH GLUCOSE 148, NO RECENT LIPIDS,TRANSIENT PALP, FH OF CAD, LONG TERM  SMOKER, RETIRED FROM  [employer],NO ETOH,  SEE ROS    Past Medical History:   Diagnosis Date    DDD (degenerative disc disease), lumbar     Diabetes mellitus     Malignant neoplasm of rectum 10/21/2020    BARON on CPAP     Renal calculi     Spinal stenosis of lumbar region      Past Surgical History:   Procedure Laterality Date    APPENDECTOMY  12/27/89    BACK SURGERY  8/2000    removed piece of bone cartilage from sciatic nerve area    ENDOSCOPIC ULTRASOUND OF LOWER GASTROINTESTINAL TRACT Left 10/21/2020    Procedure: ULTRASOUND, LOWER GI TRACT, ENDOSCOPIC;  Surgeon: Gary Randall MD;  Location: [facility];  Service: Endoscopy;  Laterality: Left;  Gif / Radial    EXAMINATION UNDER ANESTHESIA N/A 8/28/2020    Procedure: Exam under anesthesia -DILATION, RECTUM;  Surgeon: Eagle Tirado MD;  Location: [facility] OR;  Service: General;  Laterality: N/A;    FLEXIBLE SIGMOIDOSCOPY Left 10/21/2020    Procedure: SIGMOIDOSCOPY, FLEXIBLE;  Surgeon: Gary Randall MD;  Location: [facility] ENDO;  Service: Endoscopy;  Laterality: Left;    FLEXIBLE SIGMOIDOSCOPY N/A 1/4/2021    Procedure: SIGMOIDOSCOPY, FLEXIBLE;  Surgeon: ERICA Olivarez MD;  Location: [facility] ENDO;  Service: Colon and Rectal;  Laterality: N/A;    FLEXIBLE SIGMOIDOSCOPY  1/21/2021    Procedure: SIGMOIDOSCOPY, FLEXIBLE;  Surgeon: ERICA Olivarez MD;  Location:  [facility] OR 2ND FLR;  Service: Colon and Rectal;;    FLEXIBLE SIGMOIDOSCOPY N/A 5/3/2021    Procedure: SIGMOIDOSCOPY, FLEXIBLE;  Surgeon: ERICA Olivarez MD;  Location: [facility] ENDO;  Service: Colon and Rectal;  Laterality: N/A;    GALLBLADDER SURGERY  2011    KIDNEY STONE SURGERY  1/2013    LUMBAR EPIDURAL INJECTION  6/2015    MYELOGRAPHY N/A 10/31/2018    Procedure: Myelogram lumbar;  Surgeon: Minesh Infante MD;  [facility];  Service: Radiology;  Laterality: N/A;    NOSE SURGERY  1991    TRANSANAL MINIMALLY INVASIVE SURGERY (TAMIS) N/A 1/21/2021    Procedure: TAMIS (TRANSANAL MINIMALLY INVASIVE SURGERY);  Surgeon: ERICA Olivarez MD;  Location: [facility] OR [facility] FLR;  Service: Colon and Rectal;  Laterality: N/A;     Family History   Problem Relation Name Age of Onset    Diabetes Mother      Heart disease Father      Cancer Father      Diabetes Father      Kidney disease Father      Hypertension Father      Diabetes Brother       Social History     Socioeconomic History    Marital status:    Tobacco Use    Smoking status: Every Day     Current packs/day: 0.50     Average packs/day: 0.5 packs/day for 30.2 years (15.1 ttl pk-yrs)     Types: Cigarettes     Start date: 11/17/1994    Smokeless tobacco: Never   Substance and Sexual Activity    Alcohol use: No    Drug use: Yes     Types: Marijuana     Comment: nightly       Review of patient's allergies indicates:   Allergen Reactions    Hydrocodone Nausea Only       Current Outpatient Medications:     blood sugar diagnostic, disc Strp, 1 strip by Misc.(Non-Drug; Combo Route) route once daily., Disp: 30 strip, Rfl: 11    blood-glucose meter (ONETOUCH ULTRA2 METER) Misc, use as instructed, Disp: 1 each, Rfl: 0    clonazePAM (KLONOPIN) 2 MG Tab, Take 1 tablet (2 mg total) by mouth every evening., Disp: 30 tablet, Rfl: 1    cyanocobalamin 1,000 mcg/mL injection, Inject 1,000 mcg into the muscle every 28 days., Disp: , Rfl:     Lactobacillus rhamnosus GG  (CULTURELLE) 10 billion cell capsule, Take 1 capsule by mouth once daily., Disp: , Rfl:     lancets (ONETOUCH DELICA PLUS LANCET) 33 gauge Misc, USE AS DIRECTED once daily., Disp: 100 each, Rfl: 11    losartan (COZAAR) 50 MG tablet, Take 1 tablet (50 mg total) by mouth once daily., Disp: 90 tablet, Rfl: 3    metFORMIN (GLUCOPHAGE-XR) 500 MG ER 24hr tablet, Take 1,000 mg by mouth 2 (two) times daily with meals. , Disp: , Rfl:     oxyCODONE-acetaminophen (LYNOX)  mg per tablet, Take 1 tablet by mouth 3 (three) times daily as needed for Pain., Disp: , Rfl:     sildenafiL (VIAGRA) 100 MG tablet, Take 100 mg by mouth as needed., Disp: , Rfl:     testosterone cypionate (DEPOTESTOTERONE CYPIONATE) 200 mg/mL injection, Inject 1 mL (200 mg total) into the muscle every 14 (fourteen) days., Disp: 10 mL, Rfl: 0    ondansetron (ZOFRAN-ODT) 8 MG TbDL, Take 1 tablet (8 mg total) by mouth 3 (three) times daily. (Patient not taking: Reported on 1/15/2025), Disp: 30 tablet, Rfl: 0    Review of Systems   Constitutional: Negative for chills, diaphoresis, fever, malaise/fatigue and night sweats.   HENT:  Negative for congestion, hearing loss and nosebleeds.    Eyes:  Negative for blurred vision and visual disturbance.   Cardiovascular:  Positive for chest pain and palpitations. Negative for claudication, cyanosis, dyspnea on exertion, leg swelling, near-syncope, orthopnea, paroxysmal nocturnal dyspnea and syncope.   Respiratory:  Negative for cough, hemoptysis, shortness of breath and wheezing.    Endocrine: Negative for cold intolerance, heat intolerance and polyuria.   Hematologic/Lymphatic: Negative for adenopathy. Does not bruise/bleed easily.   Skin:  Negative for color change, itching and nail changes.   Musculoskeletal:  Negative for back pain (MILD CHRONIC) and falls.   Gastrointestinal:  Negative for abdominal pain, change in bowel habit, dysphagia, flatus, heartburn, hematemesis, jaundice, melena and nausea.  "  Genitourinary:  Negative for dysuria and flank pain.   Neurological:  Negative for brief paralysis, dizziness (OCC), focal weakness, light-headedness, loss of balance, paresthesias (FEET), tremors and weakness.   Psychiatric/Behavioral:  Negative for altered mental status, depression, memory loss and substance abuse (SMOKER). The patient has insomnia. The patient is not nervous/anxious.    Allergic/Immunologic: Negative for persistent infections.        Objective:      Vitals:    01/15/25 0842 01/15/25 0944   BP: (!) 145/83 134/80   Pulse: 89    Weight: 85.7 kg (188 lb 15 oz)    Height: 5' 10" (1.778 m)    PainSc: 0-No pain      Body mass index is 27.11 kg/m².    Physical Exam  HENT:      Head: Normocephalic and atraumatic.   Eyes:      Extraocular Movements: Extraocular movements intact.      Conjunctiva/sclera: Conjunctivae normal.   Neck:      Vascular: Carotid bruit present. No JVD.   Cardiovascular:      Rate and Rhythm: Normal rate and regular rhythm. No extrasystoles are present.     Pulses:           Carotid pulses are 2+ on the right side with bruit and 2+ on the left side.       Radial pulses are 2+ on the right side and 2+ on the left side.        Femoral pulses are 2+ on the right side and 2+ on the left side.       Posterior tibial pulses are 2+ on the right side and 2+ on the left side.      Heart sounds: Murmur heard.      Systolic murmur is present with a grade of 2/6 at the lower left sternal border.   Pulmonary:      Effort: Pulmonary effort is normal.      Breath sounds: Normal breath sounds and air entry.   Abdominal:      Palpations: Abdomen is soft.      Tenderness: There is no abdominal tenderness.   Musculoskeletal:      Cervical back: Neck supple.      Right lower leg: No edema.      Left lower leg: No edema.   Skin:     General: Skin is warm and dry.      Capillary Refill: Capillary refill takes less than 2 seconds.   Neurological:      General: No focal deficit present.      Mental " "Status: He is alert and oriented to person, place, and time.   Psychiatric:         Mood and Affect: Mood normal.         Speech: Speech normal.         Behavior: Behavior normal.                 ..    Chemistry        Component Value Date/Time     11/18/2024 1128    K 4.1 11/18/2024 1128     11/18/2024 1128    CO2 24 11/18/2024 1128    BUN 13 11/18/2024 1128    CREATININE 0.8 11/18/2024 1128     (H) 11/18/2024 1128        Component Value Date/Time    CALCIUM 9.0 11/18/2024 1128    ALKPHOS 71 11/18/2024 1128    AST 14 11/18/2024 1128    AST 29 11/20/2015 0423    ALT 11 11/18/2024 1128    BILITOT 0.5 11/18/2024 1128    ESTGFRAFRICA >60 07/10/2021 2142    EGFRNONAA >60 07/10/2021 2142            ..No results found for: "CHOL"  No results found for: "HDL"  No results found for: "LDLCALC"  No results found for: "TRIG"  No results found for: "CHOLHDL"  ..  Lab Results   Component Value Date    WBC 9.89 11/18/2024    HGB 14.1 11/18/2024    HCT 42.4 11/18/2024    MCV 89 11/18/2024     11/18/2024       Test(s) Reviewed  I have reviewed the following in detail:  [] Stress test   [] Angiography   [] Echocardiogram   [x] Labs   [x] Other:       Assessment:         ICD-10-CM ICD-9-CM   1. Atypical chest pain  R07.89 786.59   2. Palpitations  R00.2 785.1   3. Tobacco abuse  Z72.0 305.1   4. Primary hypertension  I10 401.9   5. Hyperlipidemia associated with type 2 diabetes mellitus  E11.69 250.80    E78.5 272.4   6. Family history of early CAD  Z82.49 V17.3   7. Right carotid bruit  R09.89 785.9   8. BARON on CPAP  G47.33 327.23   9. Systolic murmur  R01.1 785.2     Problem List Items Addressed This Visit          Cardiac/Vascular    Palpitations    Relevant Orders    Echo    Nuclear Stress - Cardiology Interpreted    Primary hypertension    Relevant Orders    LIPOPROTEIN A (LPA)    Magnesium    Hyperlipidemia associated with type 2 diabetes mellitus    Relevant Orders    Nuclear Stress - Cardiology " Interpreted    Ankle Brachial Indices (BLANQUITA)    Lipid Panel    TSH    LIPOPROTEIN A (LPA)    CRP, High Sensitivity    Family history of early CAD    Relevant Orders    Nuclear Stress - Cardiology Interpreted    Right carotid bruit    Relevant Orders    CV Ultrasound Bilateral Doppler Carotid    Systolic murmur    Relevant Orders    Echo       Other    Tobacco abuse (Chronic)    Atypical chest pain - Primary    Relevant Orders    IN OFFICE EKG 12-LEAD (to [place])    Echo    Nuclear Stress - Cardiology Interpreted    BARON on CPAP        Plan:       EKG SR WNL, WILL NEED FURTHER EVALUATION CHECK ECHO, STRESS, CAROTIDS, BLANQUITA, LABS HIGH LIKELIHOOD OF ATHERO SCLEROSUS CAD IN VIEW OF MULTIPLE RISK FACTORS, TOBACCO CESSATION COUNSELING  ALL CV CLINICALLY STABLE, ASSESS ANGINA, NO HF, NO TIA, NO CLINICAL ARRHYTHMIA,CONTINUE CURRENT MEDS, EDUCATION, DIET, EXERCISE , RETURN TO CLINIC IN FEW WEEKS AFTER TEST NO STRENUOUS ACTIVITY        Atypical chest pain  -     Ambulatory referral/consult to Cardiology  -     IN OFFICE EKG 12-LEAD (to [place])  -     Echo  -     Nuclear Stress - Cardiology Interpreted; Future    Palpitations  -     Echo  -     Nuclear Stress - Cardiology Interpreted; Future    Tobacco abuse    Primary hypertension  -     LIPOPROTEIN A (LPA); Future; Expected date: 01/15/2025  -     Magnesium; Future; Expected date: 01/15/2025    Hyperlipidemia associated with type 2 diabetes mellitus  -     Nuclear Stress - Cardiology Interpreted; Future  -     Ankle Brachial Indices (BLANQUITA); Future  -     Lipid Panel; Future; Expected date: 01/15/2025  -     TSH; Future; Expected date: 01/15/2025  -     LIPOPROTEIN A (LPA); Future; Expected date: 01/15/2025  -     CRP, High Sensitivity; Future; Expected date: 01/15/2025    Family history of early CAD  -     Nuclear Stress - Cardiology Interpreted; Future    Right carotid bruit  -     CV Ultrasound Bilateral Doppler Carotid; Future    BARON on CPAP    Systolic murmur  -     Echo    RTC  Low level/low impact aerobic exercise 5x's/wk. Heart healthy diet and risk factor modification.    See labs and med orders.    Aerobic exercise 5x's/wk. Heart healthy diet and risk factor modification.    See labs and med orders.

## 2025-01-29 LAB
OHS QRS DURATION: 90 MS
OHS QTC CALCULATION: 415 MS

## 2025-02-12 ENCOUNTER — OFFICE VISIT (OUTPATIENT)
Dept: CARDIOLOGY | Facility: CLINIC | Age: 55
End: 2025-02-12
Attending: INTERNAL MEDICINE
Payer: COMMERCIAL

## 2025-02-12 VITALS
DIASTOLIC BLOOD PRESSURE: 79 MMHG | HEART RATE: 95 BPM | BODY MASS INDEX: 26.74 KG/M2 | SYSTOLIC BLOOD PRESSURE: 137 MMHG | WEIGHT: 186.75 LBS | HEIGHT: 70 IN

## 2025-02-12 DIAGNOSIS — Z82.49 FAMILY HISTORY OF EARLY CAD: Chronic | ICD-10-CM

## 2025-02-12 DIAGNOSIS — E78.5 HYPERLIPIDEMIA ASSOCIATED WITH TYPE 2 DIABETES MELLITUS: Primary | Chronic | ICD-10-CM

## 2025-02-12 DIAGNOSIS — Z72.0 TOBACCO ABUSE: Chronic | ICD-10-CM

## 2025-02-12 DIAGNOSIS — E11.69 HYPERLIPIDEMIA ASSOCIATED WITH TYPE 2 DIABETES MELLITUS: Primary | Chronic | ICD-10-CM

## 2025-02-12 DIAGNOSIS — G47.33 OSA ON CPAP: Chronic | ICD-10-CM

## 2025-02-12 DIAGNOSIS — I10 PRIMARY HYPERTENSION: ICD-10-CM

## 2025-02-12 PROCEDURE — 3078F DIAST BP <80 MM HG: CPT | Mod: CPTII,S$GLB,, | Performed by: INTERNAL MEDICINE

## 2025-02-12 PROCEDURE — 4010F ACE/ARB THERAPY RXD/TAKEN: CPT | Mod: CPTII,S$GLB,, | Performed by: INTERNAL MEDICINE

## 2025-02-12 PROCEDURE — 3008F BODY MASS INDEX DOCD: CPT | Mod: CPTII,S$GLB,, | Performed by: INTERNAL MEDICINE

## 2025-02-12 PROCEDURE — 3075F SYST BP GE 130 - 139MM HG: CPT | Mod: CPTII,S$GLB,, | Performed by: INTERNAL MEDICINE

## 2025-02-12 PROCEDURE — 99214 OFFICE O/P EST MOD 30 MIN: CPT | Mod: S$GLB,,, | Performed by: INTERNAL MEDICINE

## 2025-02-12 PROCEDURE — 1159F MED LIST DOCD IN RCRD: CPT | Mod: CPTII,S$GLB,, | Performed by: INTERNAL MEDICINE

## 2025-02-12 RX ORDER — VARENICLINE TARTRATE 0.5 (11)-1
KIT ORAL
Qty: 1 EACH | Refills: 1 | Status: SHIPPED | OUTPATIENT
Start: 2025-02-12

## 2025-02-12 RX ORDER — LOSARTAN POTASSIUM 50 MG/1
50 TABLET ORAL DAILY
Qty: 90 TABLET | Refills: 1 | Status: SHIPPED | OUTPATIENT
Start: 2025-02-12 | End: 2026-02-12

## 2025-02-12 NOTE — PROGRESS NOTES
Subjective:    Patient ID:  Alexi Gayle is a 54 y.o. male who presents for Follow-up, Hypertension, and Heart Problem        Follow-up  Associated symptoms include congestion. Pertinent negatives include no abdominal pain, change in bowel habit, chest pain, chills, coughing, diaphoresis, fever, nausea or weakness.       CX TESTS B/O CO-PAY, AND COST, DENIES CHEST PAIN, DOING OK, SOME COLD SX, NO CP, BP BETTER, HAD LABS AT [facility], TAKING TESTOSTERONE SUPPLEMENT, SEE ROS  Past Medical History:   Diagnosis Date    DDD (degenerative disc disease), lumbar     Diabetes mellitus     Malignant neoplasm of rectum 10/21/2020    BARON on CPAP     Renal calculi     Spinal stenosis of lumbar region      Past Surgical History:   Procedure Laterality Date    APPENDECTOMY  12/27/89    BACK SURGERY  8/2000    removed piece of bone cartilage from sciatic nerve area    ENDOSCOPIC ULTRASOUND OF LOWER GASTROINTESTINAL TRACT Left 10/21/2020    Procedure: ULTRASOUND, LOWER GI TRACT, ENDOSCOPIC;  Surgeon: Gary Randall MD;  Location: [facility];  Service: Endoscopy;  Laterality: Left;  Gif / Radial    EXAMINATION UNDER ANESTHESIA N/A 8/28/2020    Procedure: Exam under anesthesia -DILATION, RECTUM;  Surgeon: Eagle Tirado MD;  Location: [facility] OR;  Service: General;  Laterality: N/A;    FLEXIBLE SIGMOIDOSCOPY Left 10/21/2020    Procedure: SIGMOIDOSCOPY, FLEXIBLE;  Surgeon: Gary Randall MD;  Location: [facility];  Service: Endoscopy;  Laterality: Left;    FLEXIBLE SIGMOIDOSCOPY N/A 1/4/2021    Procedure: SIGMOIDOSCOPY, FLEXIBLE;  Surgeon: ERICA Olivarez MD;  Location: [facility] ENDO;  Service: Colon and Rectal;  Laterality: N/A;    FLEXIBLE SIGMOIDOSCOPY  1/21/2021    Procedure: SIGMOIDOSCOPY, FLEXIBLE;  Surgeon: ERICA Olivarez MD;  Location: [address];  Service: Colon and Rectal;;    FLEXIBLE SIGMOIDOSCOPY N/A 5/3/2021    Procedure: SIGMOIDOSCOPY, FLEXIBLE;  Surgeon: ERICA Olivarez MD;  Location: [facility]  ENDO;  Service: Colon and Rectal;  Laterality: N/A;    GALLBLADDER SURGERY  2011    KIDNEY STONE SURGERY  1/2013    LUMBAR EPIDURAL INJECTION  6/2015    MYELOGRAPHY N/A 10/31/2018    Procedure: Myelogram lumbar;  Surgeon: Minesh Infante MD;  Location: [facility];  Service: Radiology;  Laterality: N/A;    NOSE SURGERY  1991    TRANSANAL MINIMALLY INVASIVE SURGERY (TAMIS) N/A 1/21/2021    Procedure: TAMIS (TRANSANAL MINIMALLY INVASIVE SURGERY);  Surgeon: ERICA Olivarez MD;  Location: [facility] OR [address] [place] [address];  Service: Colon and Rectal;  Laterality: N/A;     Family History   Problem Relation Name Age of Onset    Diabetes Mother      Heart disease Father      Cancer Father      Diabetes Father      Kidney disease Father      Hypertension Father      Diabetes Brother       Social History     Socioeconomic History    Marital status:    Tobacco Use    Smoking status: Every Day     Current packs/day: 0.50     Average packs/day: 0.5 packs/day for 30.2 years (15.1 ttl pk-yrs)     Types: Cigarettes     Start date: 11/17/1994    Smokeless tobacco: Never   Substance and Sexual Activity    Alcohol use: No    Drug use: Yes     Types: Marijuana     Comment: nightly       Review of patient's allergies indicates:   Allergen Reactions    Hydrocodone Nausea Only       Current Outpatient Medications:     blood sugar diagnostic, disc Strp, 1 strip by Misc.(Non-Drug; Combo Route) route once daily., Disp: 30 strip, Rfl: 11    blood-glucose meter (ONETOUCH ULTRA2 METER) Misc, use as instructed, Disp: 1 each, Rfl: 0    clonazePAM (KLONOPIN) 2 MG Tab, Take 1 tablet (2 mg total) by mouth every evening., Disp: 30 tablet, Rfl: 1    cyanocobalamin 1,000 mcg/mL injection, Inject 1,000 mcg into the muscle every 28 days., Disp: , Rfl:     Lactobacillus rhamnosus GG (CULTURELLE) 10 billion cell capsule, Take 1 capsule by mouth once daily., Disp: , Rfl:     lancets (ONETOUCH DELICA PLUS LANCET) 33 gauge Misc, USE AS  DIRECTED once daily., Disp: 100 each, Rfl: 11    metFORMIN (GLUCOPHAGE-XR) 500 MG ER 24hr tablet, Take 1,000 mg by mouth 2 (two) times daily with meals. , Disp: , Rfl:     oxyCODONE-acetaminophen (LYNOX)  mg per tablet, Take 1 tablet by mouth 3 (three) times daily as needed for Pain., Disp: , Rfl:     sildenafiL (VIAGRA) 100 MG tablet, Take 100 mg by mouth as needed., Disp: , Rfl:     testosterone cypionate (DEPOTESTOTERONE CYPIONATE) 200 mg/mL injection, Inject 1 mL (200 mg total) into the muscle every 14 (fourteen) days., Disp: 10 mL, Rfl: 0    losartan (COZAAR) 50 MG tablet, Take 1 tablet (50 mg total) by mouth once daily., Disp: 90 tablet, Rfl: 1    ondansetron (ZOFRAN-ODT) 8 MG TbDL, Take 1 tablet (8 mg total) by mouth 3 (three) times daily. (Patient not taking: Reported on 2/12/2025), Disp: 30 tablet, Rfl: 0    varenicline tartrate (CHANTIX STARTING MONTH BOX) 0.5 mg (11)- 1 mg (42) tablet, Take one 0.5mg tab by mouth once daily X3 days,then increase to one 0.5mg tab twice daily X4 days,then increase to one 1mg tab twice daily, Disp: 1 each, Rfl: 1    Review of Systems   Constitutional: Negative for chills, diaphoresis, fever, malaise/fatigue and night sweats.   HENT:  Positive for congestion. Negative for nosebleeds.    Eyes:  Negative for blurred vision and visual disturbance.   Cardiovascular:  Negative for chest pain, claudication, cyanosis, dyspnea on exertion, irregular heartbeat, leg swelling, near-syncope, orthopnea, palpitations, paroxysmal nocturnal dyspnea and syncope.   Respiratory:  Negative for cough, hemoptysis, shortness of breath and wheezing.    Endocrine: Negative for cold intolerance, heat intolerance and polyuria.   Hematologic/Lymphatic: Negative for adenopathy. Does not bruise/bleed easily.   Skin:  Negative for color change, itching and nail changes.   Musculoskeletal:  Negative for back pain (MILD CHRONIC) and falls.   Gastrointestinal:  Negative for abdominal pain, change  "in bowel habit, dysphagia, jaundice, melena and nausea.   Genitourinary:  Negative for dysuria and flank pain.   Neurological:  Negative for brief paralysis, dizziness (OCC), focal weakness, light-headedness, loss of balance, paresthesias (FEET) and weakness.   Psychiatric/Behavioral:  Negative for altered mental status, depression, memory loss and substance abuse (SMOKER). The patient is not nervous/anxious.    Allergic/Immunologic: Negative for persistent infections.        Objective:      Vitals:    02/12/25 1040   BP: 137/79   Pulse: 95   Weight: 84.7 kg (186 lb 11.7 oz)   Height: 5' 10" (1.778 m)   PainSc: 0-No pain     Body mass index is 26.79 kg/m².    Physical Exam            ..    Chemistry        Component Value Date/Time     11/18/2024 1128    K 4.1 11/18/2024 1128     11/18/2024 1128    CO2 24 11/18/2024 1128    BUN 13 11/18/2024 1128    CREATININE 0.8 11/18/2024 1128     (H) 11/18/2024 1128        Component Value Date/Time    CALCIUM 9.0 11/18/2024 1128    ALKPHOS 71 11/18/2024 1128    AST 14 11/18/2024 1128    AST 29 11/20/2015 0423    ALT 11 11/18/2024 1128    BILITOT 0.5 11/18/2024 1128    ESTGFRAFRICA >60 07/10/2021 2142    EGFRNONAA >60 07/10/2021 2142            ..No results found for: "CHOL"  No results found for: "HDL"  No results found for: "LDLCALC"  No results found for: "TRIG"  No results found for: "CHOLHDL"  ..  Lab Results   Component Value Date    WBC 9.89 11/18/2024    HGB 14.1 11/18/2024    HCT 42.4 11/18/2024    MCV 89 11/18/2024     11/18/2024       Test(s) Reviewed  I have reviewed the following in detail:  [] Stress test   [] Angiography   [] Echocardiogram   [] Labs   [x] Other:       Assessment:         ICD-10-CM ICD-9-CM   1. Hyperlipidemia associated with type 2 diabetes mellitus  E11.69 250.80    E78.5 272.4   2. Primary hypertension  I10 401.9   3. Tobacco abuse  Z72.0 305.1   4. BARON on CPAP  G47.33 327.23   5. Family history of early CAD  Z82.49 " V17.3   6. BMI 26.0-26.9,adult  Z68.26 V85.22     Problem List Items Addressed This Visit          Cardiac/Vascular    Primary hypertension    Hyperlipidemia associated with type 2 diabetes mellitus - Primary    Relevant Orders    Lipid Panel    LIPOPROTEIN A (LPA)    CRP, High Sensitivity    TSH    Family history of early CAD    Relevant Orders    Lipid Panel    CRP, High Sensitivity       Endocrine    BMI 26.0-26.9,adult    Relevant Medications    losartan (COZAAR) 50 MG tablet       Other    Tobacco abuse (Chronic)    BARON on CPAP        Plan:     CHECK EXTERNAL LABS PATIENT'S REQUEST, OK FOR [facility] COUNSELING REGARDING TOBACCO CESSATION VERY LONG DISCUSSION WITH THE PATIENT, RESCHEDULE TESTS 1 POSSIBLE RETURN TO CLINIC IN FEW MONTHS    ALL CV CLINICALLY STABLE, NO ANGINA, NO HF, NO TIA, NO CLINICAL ARRHYTHMIA,CONTINUE CURRENT MEDS, EDUCATION, DIET, EXERCISE , INCREASED CV RISK WITH TOBACCO AND DIABETES        Hyperlipidemia associated with type 2 diabetes mellitus  -     Lipid Panel; Future; Expected date: 02/12/2025  -     LIPOPROTEIN A (LPA); Future; Expected date: 02/12/2025  -     CRP, High Sensitivity; Future; Expected date: 02/12/2025  -     TSH; Future; Expected date: 02/12/2025    Primary hypertension    Tobacco abuse  Comments:  COUNSELING OKAY FOR CHANTIX    BARON on CPAP    Family history of early CAD  -     Lipid Panel; Future; Expected date: 02/12/2025  -     CRP, High Sensitivity; Future; Expected date: 02/12/2025    BMI 26.0-26.9,adult  -     losartan (COZAAR) 50 MG tablet; Take 1 tablet (50 mg total) by mouth once daily.  Dispense: 90 tablet; Refill: 1    Other orders  -     varenicline tartrate (CHANTIX STARTING MONTH BOX) 0.5 mg (11)- 1 mg (42) tablet; Take one 0.5mg tab by mouth once daily X3 days,then increase to one 0.5mg tab twice daily X4 days,then increase to one 1mg tab twice daily  Dispense: 1 each; Refill: 1    RTC Low level/low impact aerobic exercise 5x's/wk. Heart healthy diet and  risk factor modification.    See labs and med orders.    Aerobic exercise 5x's/wk. Heart healthy diet and risk factor modification.    See labs and med orders.

## 2025-04-23 ENCOUNTER — TELEPHONE (OUTPATIENT)
Dept: FAMILY MEDICINE | Facility: CLINIC | Age: 55
End: 2025-04-23
Payer: COMMERCIAL

## 2025-04-23 RX ORDER — MEGESTROL ACETATE 40 MG/ML
SUSPENSION ORAL
Qty: 240 ML | Refills: 0 | Status: SHIPPED | OUTPATIENT
Start: 2025-04-23

## 2025-04-23 NOTE — TELEPHONE ENCOUNTER
Please be sure pt is aware of need for appt with Alex or myself for continued refills. I did send refill for megace.

## (undated) DEVICE — ENDOSTITCH INSTRUMENT

## (undated) DEVICE — TAPE SILK 3IN

## (undated) DEVICE — SUT V-LOC 2.0 GS-21 90 DAY

## (undated) DEVICE — KIT GELPOINT TRNSANL ACC 9CM

## (undated) DEVICE — SYR ONLY LUER LOCK 20CC

## (undated) DEVICE — SEE MEDLINE ITEM 157117

## (undated) DEVICE — SEE MEDLINE ITEM 154981

## (undated) DEVICE — SUT 2/0 30IN SILK BLK BRAI

## (undated) DEVICE — ELECTRODE REM PLYHSV RETURN 9

## (undated) DEVICE — ELECTRODE BLADE INSULATED 1 IN

## (undated) DEVICE — LUBRICANT SURGILUBE 2 OZ

## (undated) DEVICE — SOL IRR WATER STRL 3000 ML

## (undated) DEVICE — PANTIES FEMININE NAPKIN LG/XLG

## (undated) DEVICE — SYR 10CC LUER LOCK

## (undated) DEVICE — TRAY FOLEY 16FR INFECTION CONT

## (undated) DEVICE — NDL 22GA X1 1/2 REG BEVEL

## (undated) DEVICE — BLADE SURG CARBON STEEL SZ11

## (undated) DEVICE — SEE MEDLINE ITEM 157148

## (undated) DEVICE — TRAY MINOR GEN SURG

## (undated) DEVICE — SEE MEDLINE ITEM 146417